# Patient Record
Sex: MALE | Race: WHITE | Employment: OTHER | URBAN - METROPOLITAN AREA
[De-identification: names, ages, dates, MRNs, and addresses within clinical notes are randomized per-mention and may not be internally consistent; named-entity substitution may affect disease eponyms.]

---

## 2017-01-11 ENCOUNTER — TELEPHONE (OUTPATIENT)
Dept: FAMILY MEDICINE CLINIC | Age: 77
End: 2017-01-11

## 2017-01-11 DIAGNOSIS — L30.8 OTHER ECZEMA: Primary | ICD-10-CM

## 2017-01-30 ENCOUNTER — TELEPHONE (OUTPATIENT)
Dept: FAMILY MEDICINE CLINIC | Age: 77
End: 2017-01-30

## 2017-06-14 ENCOUNTER — OFFICE VISIT (OUTPATIENT)
Dept: FAMILY MEDICINE CLINIC | Age: 77
End: 2017-06-14

## 2017-06-14 VITALS
DIASTOLIC BLOOD PRESSURE: 72 MMHG | WEIGHT: 190 LBS | HEIGHT: 71 IN | SYSTOLIC BLOOD PRESSURE: 122 MMHG | BODY MASS INDEX: 26.6 KG/M2 | RESPIRATION RATE: 20 BRPM | HEART RATE: 60 BPM | TEMPERATURE: 97.9 F

## 2017-06-14 DIAGNOSIS — E55.9 VITAMIN D DEFICIENCY: ICD-10-CM

## 2017-06-14 DIAGNOSIS — E72.11 HYPERHOMOCYSTEINEMIA (HCC): ICD-10-CM

## 2017-06-14 DIAGNOSIS — E11.42 TYPE 2 DIABETES MELLITUS WITH DIABETIC POLYNEUROPATHY, WITHOUT LONG-TERM CURRENT USE OF INSULIN (HCC): Primary | ICD-10-CM

## 2017-06-14 DIAGNOSIS — R45.86 MOOD DISTURBANCE: ICD-10-CM

## 2017-06-14 DIAGNOSIS — E78.5 DYSLIPIDEMIA: ICD-10-CM

## 2017-06-14 DIAGNOSIS — D86.0 SARCOIDOSIS OF LUNG (HCC): ICD-10-CM

## 2017-06-14 DIAGNOSIS — I10 ESSENTIAL HYPERTENSION: ICD-10-CM

## 2017-06-14 DIAGNOSIS — R97.20 ELEVATED PSA: ICD-10-CM

## 2017-06-14 DIAGNOSIS — I70.0 CALCIFICATION OF ABDOMINAL AORTA (HCC): ICD-10-CM

## 2017-06-14 PROCEDURE — 1036F TOBACCO NON-USER: CPT | Performed by: FAMILY MEDICINE

## 2017-06-14 PROCEDURE — 3046F HEMOGLOBIN A1C LEVEL >9.0%: CPT | Performed by: FAMILY MEDICINE

## 2017-06-14 PROCEDURE — 99214 OFFICE O/P EST MOD 30 MIN: CPT | Performed by: FAMILY MEDICINE

## 2017-06-14 PROCEDURE — G8427 DOCREV CUR MEDS BY ELIG CLIN: HCPCS | Performed by: FAMILY MEDICINE

## 2017-06-14 PROCEDURE — 3017F COLORECTAL CA SCREEN DOC REV: CPT | Performed by: FAMILY MEDICINE

## 2017-06-14 PROCEDURE — 1123F ACP DISCUSS/DSCN MKR DOCD: CPT | Performed by: FAMILY MEDICINE

## 2017-06-14 PROCEDURE — G8419 CALC BMI OUT NRM PARAM NOF/U: HCPCS | Performed by: FAMILY MEDICINE

## 2017-06-14 PROCEDURE — 4040F PNEUMOC VAC/ADMIN/RCVD: CPT | Performed by: FAMILY MEDICINE

## 2017-06-14 RX ORDER — FINASTERIDE 5 MG/1
5 TABLET, FILM COATED ORAL DAILY
COMMUNITY

## 2017-06-14 ASSESSMENT — PATIENT HEALTH QUESTIONNAIRE - PHQ9
1. LITTLE INTEREST OR PLEASURE IN DOING THINGS: 0
SUM OF ALL RESPONSES TO PHQ9 QUESTIONS 1 & 2: 0
2. FEELING DOWN, DEPRESSED OR HOPELESS: 0
SUM OF ALL RESPONSES TO PHQ QUESTIONS 1-9: 0

## 2017-06-19 DIAGNOSIS — E78.5 DYSLIPIDEMIA: ICD-10-CM

## 2017-06-19 DIAGNOSIS — I10 ESSENTIAL HYPERTENSION: ICD-10-CM

## 2017-06-19 LAB
A/G RATIO: 1.6 (ref 1.1–2.2)
ALBUMIN SERPL-MCNC: 4.8 G/DL (ref 3.4–5)
ALP BLD-CCNC: 79 U/L (ref 40–129)
ALT SERPL-CCNC: 20 U/L (ref 10–40)
ANION GAP SERPL CALCULATED.3IONS-SCNC: 13 MMOL/L (ref 3–16)
AST SERPL-CCNC: 19 U/L (ref 15–37)
BILIRUB SERPL-MCNC: 0.7 MG/DL (ref 0–1)
BUN BLDV-MCNC: 19 MG/DL (ref 7–20)
CALCIUM SERPL-MCNC: 10.1 MG/DL (ref 8.3–10.6)
CHLORIDE BLD-SCNC: 104 MMOL/L (ref 99–110)
CHOLESTEROL, TOTAL: 124 MG/DL (ref 0–199)
CO2: 27 MMOL/L (ref 21–32)
CREAT SERPL-MCNC: 0.9 MG/DL (ref 0.8–1.3)
CREATININE URINE: 109.9 MG/DL (ref 39–259)
GFR AFRICAN AMERICAN: >60
GFR NON-AFRICAN AMERICAN: >60
GLOBULIN: 3 G/DL
GLUCOSE BLD-MCNC: 99 MG/DL (ref 70–99)
HDLC SERPL-MCNC: 65 MG/DL (ref 40–60)
LDL CHOLESTEROL CALCULATED: 50 MG/DL
MICROALBUMIN UR-MCNC: <1.2 MG/DL
MICROALBUMIN/CREAT UR-RTO: NORMAL MG/G (ref 0–30)
POTASSIUM SERPL-SCNC: 4.1 MMOL/L (ref 3.5–5.1)
SODIUM BLD-SCNC: 144 MMOL/L (ref 136–145)
TOTAL PROTEIN: 7.8 G/DL (ref 6.4–8.2)
TRIGL SERPL-MCNC: 45 MG/DL (ref 0–150)
VITAMIN D 25-HYDROXY: 44.9 NG/ML
VLDLC SERPL CALC-MCNC: 9 MG/DL

## 2017-06-20 LAB
ESTIMATED AVERAGE GLUCOSE: 108.3 MG/DL
HBA1C MFR BLD: 5.4 %

## 2017-07-05 RX ORDER — ATORVASTATIN CALCIUM 40 MG/1
TABLET, FILM COATED ORAL
Qty: 30 TABLET | Refills: 5 | Status: SHIPPED | OUTPATIENT
Start: 2017-07-05 | End: 2018-01-04 | Stop reason: SDUPTHER

## 2017-07-31 RX ORDER — HYDROCHLOROTHIAZIDE 12.5 MG/1
CAPSULE, GELATIN COATED ORAL
Qty: 30 CAPSULE | Refills: 5 | Status: SHIPPED | OUTPATIENT
Start: 2017-07-31 | End: 2018-01-25 | Stop reason: SDUPTHER

## 2017-08-15 RX ORDER — AMLODIPINE BESYLATE 2.5 MG/1
TABLET ORAL
Qty: 30 TABLET | Refills: 4 | Status: SHIPPED | OUTPATIENT
Start: 2017-08-15 | End: 2018-01-12 | Stop reason: SDUPTHER

## 2017-10-17 RX ORDER — METFORMIN HYDROCHLORIDE 500 MG/1
TABLET, EXTENDED RELEASE ORAL
Qty: 60 TABLET | Refills: 4 | Status: SHIPPED | OUTPATIENT
Start: 2017-10-17 | End: 2018-03-07 | Stop reason: SDUPTHER

## 2017-10-23 ENCOUNTER — OFFICE VISIT (OUTPATIENT)
Dept: FAMILY MEDICINE CLINIC | Age: 77
End: 2017-10-23

## 2017-10-23 VITALS
WEIGHT: 186 LBS | DIASTOLIC BLOOD PRESSURE: 60 MMHG | RESPIRATION RATE: 20 BRPM | TEMPERATURE: 98.2 F | HEART RATE: 88 BPM | BODY MASS INDEX: 26.04 KG/M2 | HEIGHT: 71 IN | SYSTOLIC BLOOD PRESSURE: 116 MMHG

## 2017-10-23 DIAGNOSIS — E78.5 DYSLIPIDEMIA: ICD-10-CM

## 2017-10-23 DIAGNOSIS — H25.9 AGE-RELATED CATARACT OF BOTH EYES, UNSPECIFIED AGE-RELATED CATARACT TYPE: ICD-10-CM

## 2017-10-23 DIAGNOSIS — E72.11 HYPERHOMOCYSTEINEMIA (HCC): ICD-10-CM

## 2017-10-23 DIAGNOSIS — Z01.818 PREOP EXAMINATION: Primary | ICD-10-CM

## 2017-10-23 DIAGNOSIS — E11.42 TYPE 2 DIABETES MELLITUS WITH DIABETIC POLYNEUROPATHY, WITHOUT LONG-TERM CURRENT USE OF INSULIN (HCC): ICD-10-CM

## 2017-10-23 DIAGNOSIS — I10 ESSENTIAL HYPERTENSION: ICD-10-CM

## 2017-10-23 LAB — HBA1C MFR BLD: 5.6 %

## 2017-10-23 PROCEDURE — 1036F TOBACCO NON-USER: CPT | Performed by: FAMILY MEDICINE

## 2017-10-23 PROCEDURE — G8427 DOCREV CUR MEDS BY ELIG CLIN: HCPCS | Performed by: FAMILY MEDICINE

## 2017-10-23 PROCEDURE — G8484 FLU IMMUNIZE NO ADMIN: HCPCS | Performed by: FAMILY MEDICINE

## 2017-10-23 PROCEDURE — 3044F HG A1C LEVEL LT 7.0%: CPT | Performed by: FAMILY MEDICINE

## 2017-10-23 PROCEDURE — 93000 ELECTROCARDIOGRAM COMPLETE: CPT | Performed by: FAMILY MEDICINE

## 2017-10-23 PROCEDURE — 3017F COLORECTAL CA SCREEN DOC REV: CPT | Performed by: FAMILY MEDICINE

## 2017-10-23 PROCEDURE — 83036 HEMOGLOBIN GLYCOSYLATED A1C: CPT | Performed by: FAMILY MEDICINE

## 2017-10-23 PROCEDURE — 4040F PNEUMOC VAC/ADMIN/RCVD: CPT | Performed by: FAMILY MEDICINE

## 2017-10-23 PROCEDURE — 99214 OFFICE O/P EST MOD 30 MIN: CPT | Performed by: FAMILY MEDICINE

## 2017-10-23 PROCEDURE — 1123F ACP DISCUSS/DSCN MKR DOCD: CPT | Performed by: FAMILY MEDICINE

## 2017-10-23 PROCEDURE — G8417 CALC BMI ABV UP PARAM F/U: HCPCS | Performed by: FAMILY MEDICINE

## 2017-10-23 PROCEDURE — G8599 NO ASA/ANTIPLAT THER USE RNG: HCPCS | Performed by: FAMILY MEDICINE

## 2017-10-23 ASSESSMENT — ENCOUNTER SYMPTOMS
EYES NEGATIVE: 1
GASTROINTESTINAL NEGATIVE: 1
RESPIRATORY NEGATIVE: 1

## 2017-10-23 NOTE — PROGRESS NOTES
Component Value Date    ALT 20 06/19/2017    AST 19 06/19/2017         OA uses OTC glucosamine. This works well for him    Duration of problem: has bothered him for past year. Assoc sx are: reduced vision, ngoc at night. Alleviating factors are: none. Planned anesthesia is Local, IV sedation. The patient has the following known anesthesia issues:  NAUSEA WITH ANESTHESIA  Patient has a bleeding risk of : none. Past Medical History:   Diagnosis Date    Arthritis     Depression     Enlarged prostate     HTN (hypertension)     Hyperlipidemia     Sarcoidosis of lung- Dr Lorrie Baker 6/9/3559     Past Surgical History:   Procedure Laterality Date    ANKLE SURGERY      2014    NOSE SURGERY  30 years ago    deviated septum    PANCREATECTOMY  3/11/14    Dr Romayne Munda- Subtotal distal pancreatectomy.  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)    SPLENECTOMY, TOTAL  3.11.14    Dr Romayne Munda     Family History   Problem Relation Age of Onset    Diabetes      High Blood Pressure      Heart Disease Father      Social History     Social History    Marital status:      Spouse name: Jeff Barton Number of children: 2    Years of education: N/A     Occupational History    Retired CGE: constuction/      Social History Main Topics    Smoking status: Never Smoker    Smokeless tobacco: Never Used    Alcohol use 0.0 oz/week      Comment: socially    Drug use: No    Sexual activity: Yes     Partners: Female      Comment: wife Theesther Murray     Other Topics Concern    None     Social History Narrative    None     Current Outpatient Prescriptions   Medication Sig Dispense Refill    metFORMIN (GLUCOPHAGE-XR) 500 MG extended release tablet TAKE TWO TABLETS BY MOUTH DAILY WITH BREAKFAST 60 tablet 4    amLODIPine (NORVASC) 2.5 MG tablet TAKE ONE TABLET BY MOUTH DAILY 30 tablet 4    hydrochlorothiazide (MICROZIDE) 12.5 MG capsule TAKE ONE CAPSULE BY MOUTH DAILY 30 capsule 5    atorvastatin (LIPITOR) 40 MG Eyes:  PERRL, conjunctiva/corneas clear, EOM's intact. Ears:  Normal TM's and external ear canals, both ears   Nose: Nares normal, septum midline, mucosa normal, no drainage or sinus tenderness   Throat: Lips, mucosa, and tongue normal; teeth and gums normal   Neck: Supple, symmetrical, trachea midline, no adenopathy, thyroid: not enlarged, symmetric, no tenderness/mass/nodules, no carotid bruit or JVD   Back:   Symmetric, no curvature, ROM normal, no CVA tenderness   Lungs:   Clear to auscultation bilaterally, respirations unlabored   Chest Wall:  No tenderness or deformity   Heart:  Regular rate and rhythm, S1, S2 normal, no murmur, rub or gallop   Abdomen:   Soft, non-tender, bowel sounds active all four quadrants,  no masses, no organomegaly   Extremities: Extremities normal, atraumatic. 1+ pitting LE edema bilat   Pulses: 2+ and symmetric   Skin: Skin color, texture, turgor normal, no rashes or lesions   Lymph nodes: Cervical, supraclavicular, and axillary nodes normal   Neurologic: Normal     Cardiographics  ECG: sinus micaela, rate 50. No st/t changes    Lab Review   No visits with results within 2 Month(s) from this visit. Latest known visit with results is:   Orders Only on 06/19/2017   Component Date Value    Hemoglobin A1C 06/20/2017 5.4     eAG 06/20/2017 108.3     MICROALBUMIN, RANDOM URI* 06/19/2017 <1.20     Creatinine, Ur 06/19/2017 109.9     Microalb Creat Ratio 06/19/2017 see below     Vit D, 25-Hydroxy 06/19/2017 44.9           Assessment:   77 y.o. male with planned surgery as above. 1. Preop examination  - cleared for surgery. 2. Age-related cataract of both eyes, unspecified age-related cataract type  - agree with surgical correction    3. Type 2 diabetes mellitus with diabetic polyneuropathy, without long-term current use of insulin (HCC)  - appears to have good control. Check A1c- 5.6%. Hold metformin prior to surgery. - POCT glycosylated hemoglobin (Hb A1C)    4.

## 2018-01-04 RX ORDER — ATORVASTATIN CALCIUM 40 MG/1
TABLET, FILM COATED ORAL
Qty: 30 TABLET | Refills: 4 | Status: SHIPPED | OUTPATIENT
Start: 2018-01-04 | End: 2018-03-07 | Stop reason: SDUPTHER

## 2018-01-26 RX ORDER — HYDROCHLOROTHIAZIDE 12.5 MG/1
CAPSULE, GELATIN COATED ORAL
Qty: 30 CAPSULE | Refills: 4 | Status: SHIPPED | OUTPATIENT
Start: 2018-01-26 | End: 2018-03-07 | Stop reason: SDUPTHER

## 2018-01-29 RX ORDER — BLOOD-GLUCOSE METER
KIT MISCELLANEOUS
Qty: 50 STRIP | Refills: 5 | Status: SHIPPED | OUTPATIENT
Start: 2018-01-29 | End: 2019-03-25 | Stop reason: SDUPTHER

## 2018-03-07 RX ORDER — METFORMIN HYDROCHLORIDE 500 MG/1
TABLET, EXTENDED RELEASE ORAL
Qty: 60 TABLET | Refills: 5 | Status: SHIPPED | OUTPATIENT
Start: 2018-03-07 | End: 2018-04-23

## 2018-03-07 RX ORDER — ATORVASTATIN CALCIUM 40 MG/1
TABLET, FILM COATED ORAL
Qty: 30 TABLET | Refills: 5 | Status: SHIPPED | OUTPATIENT
Start: 2018-03-07 | End: 2019-10-22

## 2018-03-07 RX ORDER — HYDROCHLOROTHIAZIDE 12.5 MG/1
CAPSULE, GELATIN COATED ORAL
Qty: 30 CAPSULE | Refills: 5 | Status: SHIPPED | OUTPATIENT
Start: 2018-03-07 | End: 2019-10-22

## 2018-03-07 RX ORDER — AMLODIPINE BESYLATE 2.5 MG/1
TABLET ORAL
Qty: 30 TABLET | Refills: 5 | Status: SHIPPED | OUTPATIENT
Start: 2018-03-07 | End: 2019-10-22

## 2018-03-19 RX ORDER — METFORMIN HYDROCHLORIDE 500 MG/1
TABLET, EXTENDED RELEASE ORAL
Qty: 60 TABLET | Refills: 3 | Status: SHIPPED | OUTPATIENT
Start: 2018-03-19 | End: 2018-07-13 | Stop reason: SDUPTHER

## 2018-04-23 ENCOUNTER — OFFICE VISIT (OUTPATIENT)
Dept: FAMILY MEDICINE CLINIC | Age: 78
End: 2018-04-23

## 2018-04-23 VITALS
WEIGHT: 186 LBS | TEMPERATURE: 98.4 F | HEIGHT: 71 IN | DIASTOLIC BLOOD PRESSURE: 66 MMHG | SYSTOLIC BLOOD PRESSURE: 104 MMHG | OXYGEN SATURATION: 98 % | HEART RATE: 56 BPM | BODY MASS INDEX: 26.04 KG/M2 | RESPIRATION RATE: 16 BRPM

## 2018-04-23 DIAGNOSIS — D86.0 SARCOIDOSIS OF LUNG (HCC): ICD-10-CM

## 2018-04-23 DIAGNOSIS — E72.11 HYPERHOMOCYSTEINEMIA (HCC): ICD-10-CM

## 2018-04-23 DIAGNOSIS — I70.0 CALCIFICATION OF ABDOMINAL AORTA (HCC): ICD-10-CM

## 2018-04-23 DIAGNOSIS — D3A.8 NEUROENDOCRINE TUMOR OF PANCREAS: ICD-10-CM

## 2018-04-23 DIAGNOSIS — E78.5 DYSLIPIDEMIA: ICD-10-CM

## 2018-04-23 DIAGNOSIS — L82.1 SEBORRHEIC KERATOSIS: ICD-10-CM

## 2018-04-23 DIAGNOSIS — E11.42 TYPE 2 DIABETES MELLITUS WITH DIABETIC POLYNEUROPATHY, WITHOUT LONG-TERM CURRENT USE OF INSULIN (HCC): ICD-10-CM

## 2018-04-23 DIAGNOSIS — I10 ESSENTIAL HYPERTENSION: Primary | ICD-10-CM

## 2018-04-23 DIAGNOSIS — R45.86 MOOD DISTURBANCE: ICD-10-CM

## 2018-04-23 LAB — HBA1C MFR BLD: 5.6 %

## 2018-04-23 PROCEDURE — G8417 CALC BMI ABV UP PARAM F/U: HCPCS | Performed by: FAMILY MEDICINE

## 2018-04-23 PROCEDURE — 83036 HEMOGLOBIN GLYCOSYLATED A1C: CPT | Performed by: FAMILY MEDICINE

## 2018-04-23 PROCEDURE — G8427 DOCREV CUR MEDS BY ELIG CLIN: HCPCS | Performed by: FAMILY MEDICINE

## 2018-04-23 PROCEDURE — G8598 ASA/ANTIPLAT THER USED: HCPCS | Performed by: FAMILY MEDICINE

## 2018-04-23 PROCEDURE — 1036F TOBACCO NON-USER: CPT | Performed by: FAMILY MEDICINE

## 2018-04-23 PROCEDURE — 99214 OFFICE O/P EST MOD 30 MIN: CPT | Performed by: FAMILY MEDICINE

## 2018-04-23 PROCEDURE — 4040F PNEUMOC VAC/ADMIN/RCVD: CPT | Performed by: FAMILY MEDICINE

## 2018-04-23 PROCEDURE — 1123F ACP DISCUSS/DSCN MKR DOCD: CPT | Performed by: FAMILY MEDICINE

## 2018-04-23 RX ORDER — ASPIRIN 81 MG/1
81 TABLET, CHEWABLE ORAL DAILY
Refills: 0 | COMMUNITY
Start: 2018-04-23

## 2018-06-12 DIAGNOSIS — I10 ESSENTIAL HYPERTENSION: ICD-10-CM

## 2018-06-12 DIAGNOSIS — E78.5 DYSLIPIDEMIA: ICD-10-CM

## 2018-06-12 DIAGNOSIS — E72.11 HYPERHOMOCYSTEINEMIA (HCC): ICD-10-CM

## 2018-06-12 LAB
A/G RATIO: 1.9 (ref 1.1–2.2)
ALBUMIN SERPL-MCNC: 4.6 G/DL (ref 3.4–5)
ALP BLD-CCNC: 81 U/L (ref 40–129)
ALT SERPL-CCNC: 37 U/L (ref 10–40)
ANION GAP SERPL CALCULATED.3IONS-SCNC: 14 MMOL/L (ref 3–16)
AST SERPL-CCNC: 33 U/L (ref 15–37)
BILIRUB SERPL-MCNC: 0.6 MG/DL (ref 0–1)
BUN BLDV-MCNC: 19 MG/DL (ref 7–20)
CALCIUM SERPL-MCNC: 9.7 MG/DL (ref 8.3–10.6)
CHLORIDE BLD-SCNC: 106 MMOL/L (ref 99–110)
CHOLESTEROL, TOTAL: 112 MG/DL (ref 0–199)
CO2: 27 MMOL/L (ref 21–32)
CREAT SERPL-MCNC: 0.9 MG/DL (ref 0.8–1.3)
GFR AFRICAN AMERICAN: >60
GFR NON-AFRICAN AMERICAN: >60
GLOBULIN: 2.4 G/DL
GLUCOSE BLD-MCNC: 101 MG/DL (ref 70–99)
HDLC SERPL-MCNC: 70 MG/DL (ref 40–60)
HOMOCYSTEINE: 11 UMOL/L (ref 0–10)
LDL CHOLESTEROL CALCULATED: 35 MG/DL
POTASSIUM SERPL-SCNC: 4.7 MMOL/L (ref 3.5–5.1)
SODIUM BLD-SCNC: 147 MMOL/L (ref 136–145)
TOTAL PROTEIN: 7 G/DL (ref 6.4–8.2)
TRIGL SERPL-MCNC: 33 MG/DL (ref 0–150)
VLDLC SERPL CALC-MCNC: 7 MG/DL

## 2018-06-14 RX ORDER — B12/LEVOMEFOLATE CALCIUM/B-6 2-1.13-25
2 TABLET ORAL DAILY
Qty: 60 TABLET | Refills: 11 | Status: SHIPPED | OUTPATIENT
Start: 2018-06-14

## 2018-06-19 RX ORDER — HYDROCHLOROTHIAZIDE 12.5 MG/1
CAPSULE, GELATIN COATED ORAL
Qty: 30 CAPSULE | Refills: 5 | Status: SHIPPED | OUTPATIENT
Start: 2018-06-19 | End: 2018-12-17 | Stop reason: SDUPTHER

## 2018-06-27 ENCOUNTER — TELEPHONE (OUTPATIENT)
Dept: FAMILY MEDICINE CLINIC | Age: 78
End: 2018-06-27

## 2018-07-09 RX ORDER — AMLODIPINE BESYLATE 2.5 MG/1
TABLET ORAL
Qty: 30 TABLET | Refills: 4 | Status: SHIPPED | OUTPATIENT
Start: 2018-07-09 | End: 2018-12-03 | Stop reason: SDUPTHER

## 2018-09-04 RX ORDER — ATORVASTATIN CALCIUM 40 MG/1
TABLET, FILM COATED ORAL
Qty: 30 TABLET | Refills: 4 | Status: SHIPPED | OUTPATIENT
Start: 2018-09-04 | End: 2019-02-08

## 2018-10-08 ENCOUNTER — OFFICE VISIT (OUTPATIENT)
Dept: ENT CLINIC | Age: 78
End: 2018-10-08
Payer: MEDICARE

## 2018-10-08 VITALS
HEART RATE: 53 BPM | HEIGHT: 71 IN | SYSTOLIC BLOOD PRESSURE: 135 MMHG | WEIGHT: 190.4 LBS | BODY MASS INDEX: 26.65 KG/M2 | DIASTOLIC BLOOD PRESSURE: 73 MMHG

## 2018-10-08 DIAGNOSIS — H93.8X2 FULLNESS IN EAR, LEFT: Primary | ICD-10-CM

## 2018-10-08 PROCEDURE — G8484 FLU IMMUNIZE NO ADMIN: HCPCS | Performed by: OTOLARYNGOLOGY

## 2018-10-08 PROCEDURE — G8598 ASA/ANTIPLAT THER USED: HCPCS | Performed by: OTOLARYNGOLOGY

## 2018-10-08 PROCEDURE — 1123F ACP DISCUSS/DSCN MKR DOCD: CPT | Performed by: OTOLARYNGOLOGY

## 2018-10-08 PROCEDURE — 4040F PNEUMOC VAC/ADMIN/RCVD: CPT | Performed by: OTOLARYNGOLOGY

## 2018-10-08 PROCEDURE — G8427 DOCREV CUR MEDS BY ELIG CLIN: HCPCS | Performed by: OTOLARYNGOLOGY

## 2018-10-08 PROCEDURE — 1036F TOBACCO NON-USER: CPT | Performed by: OTOLARYNGOLOGY

## 2018-10-08 PROCEDURE — 1101F PT FALLS ASSESS-DOCD LE1/YR: CPT | Performed by: OTOLARYNGOLOGY

## 2018-10-08 PROCEDURE — G8417 CALC BMI ABV UP PARAM F/U: HCPCS | Performed by: OTOLARYNGOLOGY

## 2018-10-08 PROCEDURE — 99203 OFFICE O/P NEW LOW 30 MIN: CPT | Performed by: OTOLARYNGOLOGY

## 2018-10-08 NOTE — PROGRESS NOTES
test strips (FREESTYLE TEST STRIPS) strip, 1 each by In Vitro route 3 times daily As needed. , Disp: 300 each, Rfl: 3    glucose monitoring kit (FREESTYLE) monitoring kit, 1 kit by Does not apply route daily as needed, Disp: 1 kit, Rfl: 0    FREESTYLE LANCETS MISC, 1 each by Does not apply route daily, Disp: 100 each, Rfl: 3    tamsulosin (FLOMAX) 0.4 MG capsule, Take 0.4 mg by mouth daily, Disp: , Rfl:     polyethylene glycol (GLYCOLAX) powder, Take 17 g by mouth daily, Disp: , Rfl:     Cholecalciferol (VITAMIN D) 2000 UNITS CAPS capsule, Take 4,000 Units by mouth daily, Disp: , Rfl:     Misc Natural Products (GLUCOSAMINE CHOND COMPLEX/MSM PO), Take 1,500 mg by mouth daily , Disp: , Rfl:                                                  Past Medical History:   Diagnosis Date    Arthritis     Depression     Enlarged prostate     HTN (hypertension)     Hyperlipidemia     Sarcoidosis of lung- Dr Mel Du 5/2/8345                                                    Past Surgical History:   Procedure Laterality Date    ANKLE SURGERY      2014    NOSE SURGERY  30 years ago    deviated septum    PANCREATECTOMY  3/11/14    Dr Joseph Buchanan- Subtotal distal pancreatectomy. 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 Otherwise, all systems are reviewed and negative. PHYSICAL EXAMINATION:   GENERAL: wdwn- no acute distress  COMMUNICATION :  Normal voice  MENTAL STATUS:  Normal  HEAD AND FACE:  Normal  EXTERNAL EARS AND NOSE:  Normal  FACIAL MUSCLES:  Normal  EXTRAOCULAR MUSCLES: Intact  FACE PALPATION:  Negative  OTOSCOPY:  Normal tympanic membranes and middle ear spaces  TUNING FORKS: Rinne ++ Wood midline at 512 Hz  INTRANASAL:  Septum midline, turbinates normal, meati clear.   LIPS, TEETH, GINGIVA:  Normal mucosa  PHARYNX:  Normal  NECK:  No masses or

## 2018-10-23 ENCOUNTER — OFFICE VISIT (OUTPATIENT)
Dept: FAMILY MEDICINE CLINIC | Age: 78
End: 2018-10-23
Payer: MEDICARE

## 2018-10-23 VITALS
TEMPERATURE: 97.8 F | BODY MASS INDEX: 26.74 KG/M2 | HEIGHT: 71 IN | RESPIRATION RATE: 15 BRPM | SYSTOLIC BLOOD PRESSURE: 124 MMHG | HEART RATE: 57 BPM | WEIGHT: 191 LBS | DIASTOLIC BLOOD PRESSURE: 62 MMHG

## 2018-10-23 DIAGNOSIS — E72.11 HYPERHOMOCYSTEINEMIA (HCC): ICD-10-CM

## 2018-10-23 DIAGNOSIS — I70.0 CALCIFICATION OF ABDOMINAL AORTA (HCC): ICD-10-CM

## 2018-10-23 DIAGNOSIS — K64.4 EXTERNAL HEMORRHOIDS: ICD-10-CM

## 2018-10-23 DIAGNOSIS — E78.5 DYSLIPIDEMIA: ICD-10-CM

## 2018-10-23 DIAGNOSIS — I10 ESSENTIAL HYPERTENSION: ICD-10-CM

## 2018-10-23 DIAGNOSIS — E11.42 TYPE 2 DIABETES MELLITUS WITH DIABETIC POLYNEUROPATHY, WITHOUT LONG-TERM CURRENT USE OF INSULIN (HCC): Primary | ICD-10-CM

## 2018-10-23 LAB — HBA1C MFR BLD: 5.8 %

## 2018-10-23 PROCEDURE — G8427 DOCREV CUR MEDS BY ELIG CLIN: HCPCS | Performed by: FAMILY MEDICINE

## 2018-10-23 PROCEDURE — G8482 FLU IMMUNIZE ORDER/ADMIN: HCPCS | Performed by: FAMILY MEDICINE

## 2018-10-23 PROCEDURE — 83036 HEMOGLOBIN GLYCOSYLATED A1C: CPT | Performed by: FAMILY MEDICINE

## 2018-10-23 PROCEDURE — 1036F TOBACCO NON-USER: CPT | Performed by: FAMILY MEDICINE

## 2018-10-23 PROCEDURE — 99214 OFFICE O/P EST MOD 30 MIN: CPT | Performed by: FAMILY MEDICINE

## 2018-10-23 PROCEDURE — G8598 ASA/ANTIPLAT THER USED: HCPCS | Performed by: FAMILY MEDICINE

## 2018-10-23 PROCEDURE — 1123F ACP DISCUSS/DSCN MKR DOCD: CPT | Performed by: FAMILY MEDICINE

## 2018-10-23 PROCEDURE — G8417 CALC BMI ABV UP PARAM F/U: HCPCS | Performed by: FAMILY MEDICINE

## 2018-10-23 PROCEDURE — 4040F PNEUMOC VAC/ADMIN/RCVD: CPT | Performed by: FAMILY MEDICINE

## 2018-10-23 PROCEDURE — 1101F PT FALLS ASSESS-DOCD LE1/YR: CPT | Performed by: FAMILY MEDICINE

## 2018-10-23 ASSESSMENT — PATIENT HEALTH QUESTIONNAIRE - PHQ9
SUM OF ALL RESPONSES TO PHQ QUESTIONS 1-9: 0
1. LITTLE INTEREST OR PLEASURE IN DOING THINGS: 0
SUM OF ALL RESPONSES TO PHQ9 QUESTIONS 1 & 2: 0
2. FEELING DOWN, DEPRESSED OR HOPELESS: 0
SUM OF ALL RESPONSES TO PHQ QUESTIONS 1-9: 0

## 2018-10-23 NOTE — PROGRESS NOTES
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  Calcification of abdominal aorta (HCC)    Eczema    S/P colonoscopy with polypectomy-12/8/16; [facility]; multiple polyps. recall 3 yrs.  Type 2 diabetes mellitus with diabetic polyneuropathy, without long-term current use of insulin (HCC)      Body mass index is 26.64 kg/m².     Wt Readings from Last 3 Encounters:   10/23/18 191 lb (86.6 kg)   10/08/18 190 lb 6.4 oz (86.4 kg)   04/23/18 186 lb (84.4 kg)      BP Readings from Last 3 Encounters:   10/23/18 124/62   10/08/18 135/73   04/23/18 104/66      Current Outpatient Prescriptions   Medication Sig Dispense Refill    atorvastatin (LIPITOR) 40 MG tablet TAKE ONE TABLET BY MOUTH DAILY 30 tablet 4    metFORMIN (GLUCOPHAGE-XR) 500 MG extended release tablet TAKE TWO TABLETS BY MOUTH DAILY WITH BREAKFAST 60 tablet 5    amLODIPine (NORVASC) 2.5 MG tablet TAKE ONE TABLET BY MOUTH DAILY 30 tablet 4    hydrochlorothiazide (MICROZIDE) 12.5 MG capsule TAKE ONE CAPSULE BY MOUTH DAILY 30 capsule 5    L-Methylfolate-B6-B12 (FOLBIC RF) 1.13-25-2 MG TABS Take 2 tablets by mouth daily 60 tablet 11    aspirin (ASPIRIN CHILDRENS) 81 MG chewable tablet Take 1 tablet by mouth daily  0    hydrochlorothiazide (MICROZIDE) 12.5 MG capsule TAKE ONE CAPSULE BY MOUTH DAILY 30 capsule 5    amLODIPine (NORVASC) 2.5 MG tablet TAKE ONE TABLET BY MOUTH DAILY 30 tablet 5    atorvastatin (LIPITOR) 40 MG tablet TAKE ONE TABLET BY MOUTH DAILY 30 tablet 5    FREESTYLE LITE strip USE TO TEST BLOOD SUGAR DAILY AS NEEDED 50 strip 5    finasteride (PROSCAR) 5 MG tablet Take 5 mg by mouth daily      hydrocortisone (PROCTOZONE-HC) 2.5 % rectal cream PLACE RECTALLY TWO TIMES A DAY AS NEEDED 30 g 1    hydrocortisone (WESTCORT) 0.2 % cream Apply topically 2 times daily. 15 g 0    glucose blood VI test strips (FREESTYLE TEST STRIPS) strip 1 each by In Vitro route 3 times daily As needed. 300 each 3    glucose monitoring kit (FREESTYLE) monitoring kit 1 kit by Does not apply route daily as needed 1 kit 0    FREESTYLE LANCETS MISC 1 each by Does not apply route daily 100 each 3    tamsulosin (FLOMAX) 0.4 MG capsule Take 0.4 mg by mouth daily      polyethylene glycol (GLYCOLAX) powder Take 17 g by mouth daily      Cholecalciferol (VITAMIN D) 2000 UNITS CAPS capsule Take 4,000 Units by mouth daily      Misc Natural Products (GLUCOSAMINE CHOND COMPLEX/MSM PO) Take 1,500 mg by mouth daily        No current facility-administered medications for this visit. Immunization History   Administered Date(s) Administered    Influenza Vaccine, unspecified formulation 10/10/2016    Influenza, High Dose (Fluzone 65 yrs and older) 10/09/2013, 11/07/2014    Pneumococcal 13-valent Conjugate ([id number]) 02/23/2016    Pneumococcal Polysaccharide ([id number]) 01/08/2013        Social History   Substance Use Topics    Smoking status: Never Smoker    Smokeless tobacco: Never Used    Alcohol use 0.0 oz/week      Comment: socially        Review of Systems No chest pains, dizziness, heart palpitations, dyspnea, lightheadedness, worsening edema. Objective:   Physical Exam   Constitutional: He is oriented to person, place, and time. He appears well-developed and well-nourished. Neck: Normal range of motion. Neck supple. Carotid bruit is not present. No thyromegaly present. Cardiovascular: Normal rate, regular rhythm, normal heart sounds and intact distal pulses. No murmur heard.   Pulmonary/Chest: Effort normal and breath sounds

## 2018-10-29 RX ORDER — LANCETS 28 GAUGE
EACH MISCELLANEOUS
Qty: 100 EACH | Refills: 5 | Status: SHIPPED | OUTPATIENT
Start: 2018-10-29 | End: 2020-07-20

## 2019-02-08 ENCOUNTER — OFFICE VISIT (OUTPATIENT)
Dept: ENT CLINIC | Age: 79
End: 2019-02-08
Payer: MEDICARE

## 2019-02-08 VITALS
HEIGHT: 71 IN | TEMPERATURE: 98 F | BODY MASS INDEX: 27.05 KG/M2 | DIASTOLIC BLOOD PRESSURE: 52 MMHG | WEIGHT: 193.2 LBS | HEART RATE: 50 BPM | SYSTOLIC BLOOD PRESSURE: 122 MMHG

## 2019-02-08 DIAGNOSIS — R04.0 EPISTAXIS: Primary | ICD-10-CM

## 2019-02-08 PROCEDURE — 1036F TOBACCO NON-USER: CPT | Performed by: OTOLARYNGOLOGY

## 2019-02-08 PROCEDURE — G8417 CALC BMI ABV UP PARAM F/U: HCPCS | Performed by: OTOLARYNGOLOGY

## 2019-02-08 PROCEDURE — 99213 OFFICE O/P EST LOW 20 MIN: CPT | Performed by: OTOLARYNGOLOGY

## 2019-02-08 PROCEDURE — 1101F PT FALLS ASSESS-DOCD LE1/YR: CPT | Performed by: OTOLARYNGOLOGY

## 2019-02-08 PROCEDURE — 4040F PNEUMOC VAC/ADMIN/RCVD: CPT | Performed by: OTOLARYNGOLOGY

## 2019-02-08 PROCEDURE — G8598 ASA/ANTIPLAT THER USED: HCPCS | Performed by: OTOLARYNGOLOGY

## 2019-02-08 PROCEDURE — G8482 FLU IMMUNIZE ORDER/ADMIN: HCPCS | Performed by: OTOLARYNGOLOGY

## 2019-02-08 PROCEDURE — G8427 DOCREV CUR MEDS BY ELIG CLIN: HCPCS | Performed by: OTOLARYNGOLOGY

## 2019-02-08 PROCEDURE — 1123F ACP DISCUSS/DSCN MKR DOCD: CPT | Performed by: OTOLARYNGOLOGY

## 2019-03-25 RX ORDER — BLOOD-GLUCOSE METER
KIT MISCELLANEOUS
Qty: 50 STRIP | Refills: 4 | Status: SHIPPED | OUTPATIENT
Start: 2019-03-25 | End: 2020-07-20

## 2019-04-23 ENCOUNTER — OFFICE VISIT (OUTPATIENT)
Dept: FAMILY MEDICINE CLINIC | Age: 79
End: 2019-04-23
Payer: MEDICARE

## 2019-04-23 VITALS
RESPIRATION RATE: 18 BRPM | WEIGHT: 196 LBS | DIASTOLIC BLOOD PRESSURE: 64 MMHG | SYSTOLIC BLOOD PRESSURE: 122 MMHG | OXYGEN SATURATION: 98 % | TEMPERATURE: 98.1 F | HEIGHT: 71 IN | BODY MASS INDEX: 27.44 KG/M2 | HEART RATE: 58 BPM

## 2019-04-23 DIAGNOSIS — Z90.81 S/P SPLENECTOMY: ICD-10-CM

## 2019-04-23 DIAGNOSIS — D3A.8 NEUROENDOCRINE TUMOR OF PANCREAS: ICD-10-CM

## 2019-04-23 DIAGNOSIS — E78.5 DYSLIPIDEMIA: ICD-10-CM

## 2019-04-23 DIAGNOSIS — I10 ESSENTIAL HYPERTENSION: ICD-10-CM

## 2019-04-23 DIAGNOSIS — E72.11 HYPERHOMOCYSTEINEMIA (HCC): ICD-10-CM

## 2019-04-23 DIAGNOSIS — I70.0 CALCIFICATION OF ABDOMINAL AORTA (HCC): ICD-10-CM

## 2019-04-23 DIAGNOSIS — D86.0 SARCOIDOSIS OF LUNG (HCC): ICD-10-CM

## 2019-04-23 DIAGNOSIS — E11.42 TYPE 2 DIABETES MELLITUS WITH DIABETIC POLYNEUROPATHY, WITHOUT LONG-TERM CURRENT USE OF INSULIN (HCC): Primary | ICD-10-CM

## 2019-04-23 LAB — HBA1C MFR BLD: 5.6 %

## 2019-04-23 PROCEDURE — 4040F PNEUMOC VAC/ADMIN/RCVD: CPT | Performed by: FAMILY MEDICINE

## 2019-04-23 PROCEDURE — G8427 DOCREV CUR MEDS BY ELIG CLIN: HCPCS | Performed by: FAMILY MEDICINE

## 2019-04-23 PROCEDURE — G8598 ASA/ANTIPLAT THER USED: HCPCS | Performed by: FAMILY MEDICINE

## 2019-04-23 PROCEDURE — 83036 HEMOGLOBIN GLYCOSYLATED A1C: CPT | Performed by: FAMILY MEDICINE

## 2019-04-23 PROCEDURE — G8417 CALC BMI ABV UP PARAM F/U: HCPCS | Performed by: FAMILY MEDICINE

## 2019-04-23 PROCEDURE — 1036F TOBACCO NON-USER: CPT | Performed by: FAMILY MEDICINE

## 2019-04-23 PROCEDURE — 99214 OFFICE O/P EST MOD 30 MIN: CPT | Performed by: FAMILY MEDICINE

## 2019-04-23 PROCEDURE — 1123F ACP DISCUSS/DSCN MKR DOCD: CPT | Performed by: FAMILY MEDICINE

## 2019-04-23 ASSESSMENT — PATIENT HEALTH QUESTIONNAIRE - PHQ9
SUM OF ALL RESPONSES TO PHQ QUESTIONS 1-9: 0
1. LITTLE INTEREST OR PLEASURE IN DOING THINGS: 0
SUM OF ALL RESPONSES TO PHQ QUESTIONS 1-9: 0
2. FEELING DOWN, DEPRESSED OR HOPELESS: 0
SUM OF ALL RESPONSES TO PHQ9 QUESTIONS 1 & 2: 0

## 2019-04-23 NOTE — PROGRESS NOTES
Subjective:      Patient ID: Titi Webster is a 69 y.o. male. Blood pressure 122/64, pulse 58, temperature 98.1 °F (36.7 °C), temperature source Oral, resp. rate 18, height 5' 11\" (1.803 m), weight 196 lb (88.9 kg), SpO2 98 %. HPI Here for routine follow up of DM. He still feels low energy, in his estimation. Like he's not 26 any longer. Not worse than 6 mo ago. He has never had a sleep study. His sleep is disturbed by nocturia x 3-4 due to BPH. Sees Dr Mayelin Apple. But rarely naps and has enough power for chores. He feels that he is still depressed over losign his son also. He saw ENT for nosebleeds in the winter. He has been using nasal saline and that was effective (as long as he uses it consistently). He has no new concerns today. His allergies have been flared up recently. Using zyrtec and zaditor with good results. Has DM-2 with complications- DPN (sees DR Meenu Jaramillo, no complications- he mainly trims nails and pares calluses). Diet controlled. a1c is only 5.6. Checks glucoses rarely. Lab Results   Component Value Date    LABA1C 5.8 10/23/2018     Lab Results   Component Value Date    .3 06/19/2017      Sees eye doctor in June. Uses atorav 40. No SE's. Is not fasting today. Lab Results   Component Value Date    CHOL 112 06/12/2018    TRIG 33 06/12/2018    HDL 70 (H) 06/12/2018    LDLCALC 35 06/12/2018     Lab Results   Component Value Date    ALT 37 06/12/2018    AST 33 06/12/2018        He has not seen Dr Annika Zuleta for lung sarcoid for about 5 yrs. Breathing is good. No meds for this. He had neuroendocrine tumor of pancrease with subsequent partial pancreatectomy in 2014. Has the all clear, but needs ongoing vaccinations. He will be due for repeat vaccination for hib, meningococcal and pneumovax 5/19/19. Remains on methylfolate due to hyperhomocystenemia. No prior CAD/CVD. 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       bp meds reviewed and are accurate as below. Has normal renal function   Lab Results   Component Value Date     06/12/2018    K 4.7 06/12/2018    BUN 19 06/12/2018    CREATININE 0.9 06/12/2018          Patient Active Problem List   Diagnosis    Sarcoidosis of lung- Dr Denton Leon loss of right eye- partial.  cause unknown. 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  Calcification of abdominal aorta (HCC)    Eczema    S/P colonoscopy with polypectomy-12/8/16; [facility]; multiple polyps. recall 3 yrs.  Type 2 diabetes mellitus with diabetic polyneuropathy, without long-term current use of insulin (HCC)    External hemorrhoids      Body mass index is 27.34 kg/m².     Wt Readings from Last 3 Encounters:   04/23/19 196 lb (88.9 kg)   02/08/19 193 lb 3.2 oz (87.6 kg)   10/23/18 191 lb (86.6 kg)      BP Readings from Last 3 Encounters:   04/23/19 122/64   02/08/19 (!) 122/52   10/23/18 124/62      Current Outpatient Medications   Medication Sig Dispense Refill    atorvastatin (LIPITOR) 40 MG tablet TAKE ONE TABLET BY MOUTH DAILY 30 tablet 5    FREESTYLE LITE strip USE ONE STRIP TO TEST ONCE DAILY AS NEEDED 50 strip 4    FREESTYLE LANCETS MISC USE DAILY 100 each 5    hydrocortisone (PROCTOZONE-HC) 2.5 % rectal cream PLACE RECTALLY TWO TIMES A DAY AS NEEDED 30 g 1    L-Methylfolate-B6-B12 (FOLBIC RF) 1.13-25-2 MG TABS Take 2 tablets by mouth daily 60 tablet 11    aspirin (ASPIRIN CHILDRENS) 81 MG chewable tablet Take 1 tablet by mouth daily  0    hydrochlorothiazide (MICROZIDE) 12.5 MG capsule TAKE ONE CAPSULE BY MOUTH DAILY 30 capsule 5    amLODIPine (NORVASC) 2.5 MG tablet TAKE ONE TABLET BY MOUTH DAILY 30 tablet 5    atorvastatin (LIPITOR) 40 MG tablet TAKE ONE TABLET BY MOUTH DAILY 30 tablet 5    finasteride (PROSCAR) 5 MG tablet Take 5 mg by mouth daily      hydrocortisone (WESTCORT) 0.2 % cream Apply topically 2 times daily. 15 g 0    glucose monitoring kit (FREESTYLE) monitoring kit 1 kit by Does not apply route daily as needed 1 kit 0    tamsulosin (FLOMAX) 0.4 MG capsule Take 0.4 mg by mouth daily      polyethylene glycol (GLYCOLAX) powder Take 17 g by mouth daily      Cholecalciferol (VITAMIN D) 2000 UNITS CAPS capsule Take 4,000 Units by mouth daily      Misc Natural Products (GLUCOSAMINE CHOND COMPLEX/MSM PO) Take 1,500 mg by mouth daily        No current facility-administered medications for this visit. Immunization History   Administered Date(s) Administered    Hib, unspecified formulation 05/19/2014    Influenza Vaccine, unspecified formulation 10/10/2016    Influenza, High Dose (Fluzone 65 yrs and older) 10/09/2013, 11/07/2014, 09/03/2018    Meningococcal MPSV4 (Menomune) 05/19/2014    Pneumococcal 13-valent Conjugate ([id number]) 02/23/2016    Pneumococcal Polysaccharide ([id number]) 01/08/2013, 05/19/2014        Social History     Tobacco Use    Smoking status: Never Smoker    Smokeless tobacco: Never Used   Substance Use Topics    Alcohol use: Yes     Alcohol/week: 0.0 oz     Comment: socially    Drug use: No        Review of Systems No chest pains, dizziness, heart palpitations, dyspnea, lightheadedness, worsening edema. Objective:   Physical Exam   Constitutional: He is oriented to person, place, and time. He appears well-developed and well-nourished. Neck: Normal range of motion. Neck supple. Carotid bruit is not present. No thyromegaly present. Cardiovascular: Normal rate, regular rhythm, normal heart sounds and intact distal pulses. No murmur heard. Pulmonary/Chest: Effort normal and breath sounds normal. No respiratory distress. He has no wheezes. He has no rales. He exhibits no tenderness. Musculoskeletal: He exhibits edema (mild LE edema r>l). Feet normal; no lesions. Sensation intact to SW monofilament and vibratory sense bilaterally with intact pulses. Neurological: He is alert and oriented to person, place, and time. Skin: Skin is warm and dry. Psychiatric: He has a normal mood and affect. His behavior is normal. Judgment and thought content normal.   Nursing note and vitals reviewed. Assessment:      1. Type 2 diabetes mellitus with diabetic polyneuropathy, without long-term current use of insulin (HCC)  - POCT glycosylated hemoglobin (Hb A1C) is in non-diabetic range now. 5.6. Continue to  Monitor.   - Microalbumin / Creatinine Urine Ratio; Future  -  DIABETES FOOT EXAM    2. Hyperhomocysteinemia ([facility] [address]- 12  (11/14)  - he is happy to continue B vitamins for this- may prevent future venous and artrial diseases. 3. Dyslipidemia  - Stable; continue atorva. - Lipid Panel; Future    4. Sarcoidosis of lung- Dr Lesli Ruby  - is asymtpomatic; does not intend to see pulm    5. Calcification of abdominal aorta (HCC)  - he is happy to remain on asa 81 despite paucity of evidence for this specifically. 6. Essential hypertension  - Blood pressure is at goal on current therapy; continue meds and monitoring. Regular physical activity and healthy diet (low sodium, multiple servings of produce daily) was recommended. Renal function to be assessed yearly.   - COMPREHENSIVE METABOLIC PANEL; Future    7. Neuroendocrine tumor of pancreas  - no sign of recurrence per Dr Nahed Lopez at [facility].    8. S/P splenectomy  - he declines vaccines to encapsulated organisms. But he will think about it. I told him we could give the Pneumovax in the hip due to prior swelling. Plan: Fu 6 mo.     He was advised to consider sleep eval for his chronic fatigue.         Beronica Almonte MD

## 2019-04-29 DIAGNOSIS — I10 ESSENTIAL HYPERTENSION: ICD-10-CM

## 2019-04-29 DIAGNOSIS — E11.42 TYPE 2 DIABETES MELLITUS WITH DIABETIC POLYNEUROPATHY, WITHOUT LONG-TERM CURRENT USE OF INSULIN (HCC): ICD-10-CM

## 2019-04-29 DIAGNOSIS — E78.5 DYSLIPIDEMIA: ICD-10-CM

## 2019-04-29 LAB
A/G RATIO: 1.6 (ref 1.1–2.2)
ALBUMIN SERPL-MCNC: 4.4 G/DL (ref 3.4–5)
ALP BLD-CCNC: 88 U/L (ref 40–129)
ALT SERPL-CCNC: 30 U/L (ref 10–40)
ANION GAP SERPL CALCULATED.3IONS-SCNC: 9 MMOL/L (ref 3–16)
AST SERPL-CCNC: 23 U/L (ref 15–37)
BILIRUB SERPL-MCNC: 0.7 MG/DL (ref 0–1)
BUN BLDV-MCNC: 19 MG/DL (ref 7–20)
CALCIUM SERPL-MCNC: 10 MG/DL (ref 8.3–10.6)
CHLORIDE BLD-SCNC: 104 MMOL/L (ref 99–110)
CHOLESTEROL, TOTAL: 120 MG/DL (ref 0–199)
CO2: 29 MMOL/L (ref 21–32)
CREAT SERPL-MCNC: 1 MG/DL (ref 0.8–1.3)
CREATININE URINE: 151.3 MG/DL (ref 39–259)
GFR AFRICAN AMERICAN: >60
GFR NON-AFRICAN AMERICAN: >60
GLOBULIN: 2.8 G/DL
GLUCOSE BLD-MCNC: 104 MG/DL (ref 70–99)
HDLC SERPL-MCNC: 63 MG/DL (ref 40–60)
LDL CHOLESTEROL CALCULATED: 48 MG/DL
MICROALBUMIN UR-MCNC: <1.2 MG/DL
MICROALBUMIN/CREAT UR-RTO: NORMAL MG/G (ref 0–30)
POTASSIUM SERPL-SCNC: 4.5 MMOL/L (ref 3.5–5.1)
SODIUM BLD-SCNC: 142 MMOL/L (ref 136–145)
TOTAL PROTEIN: 7.2 G/DL (ref 6.4–8.2)
TRIGL SERPL-MCNC: 44 MG/DL (ref 0–150)
VLDLC SERPL CALC-MCNC: 9 MG/DL

## 2019-10-22 ENCOUNTER — OFFICE VISIT (OUTPATIENT)
Dept: FAMILY MEDICINE CLINIC | Age: 79
End: 2019-10-22
Payer: MEDICARE

## 2019-10-22 VITALS
SYSTOLIC BLOOD PRESSURE: 116 MMHG | WEIGHT: 201 LBS | TEMPERATURE: 98 F | OXYGEN SATURATION: 98 % | DIASTOLIC BLOOD PRESSURE: 70 MMHG | BODY MASS INDEX: 28.14 KG/M2 | HEART RATE: 58 BPM | HEIGHT: 71 IN

## 2019-10-22 DIAGNOSIS — E11.42 TYPE 2 DIABETES MELLITUS WITH DIABETIC POLYNEUROPATHY, WITHOUT LONG-TERM CURRENT USE OF INSULIN (HCC): Primary | ICD-10-CM

## 2019-10-22 DIAGNOSIS — I10 ESSENTIAL HYPERTENSION: ICD-10-CM

## 2019-10-22 LAB — HBA1C MFR BLD: 5.9 %

## 2019-10-22 PROCEDURE — 83036 HEMOGLOBIN GLYCOSYLATED A1C: CPT | Performed by: FAMILY MEDICINE

## 2019-10-22 PROCEDURE — G8482 FLU IMMUNIZE ORDER/ADMIN: HCPCS | Performed by: FAMILY MEDICINE

## 2019-10-22 PROCEDURE — G8598 ASA/ANTIPLAT THER USED: HCPCS | Performed by: FAMILY MEDICINE

## 2019-10-22 PROCEDURE — G8427 DOCREV CUR MEDS BY ELIG CLIN: HCPCS | Performed by: FAMILY MEDICINE

## 2019-10-22 PROCEDURE — G8417 CALC BMI ABV UP PARAM F/U: HCPCS | Performed by: FAMILY MEDICINE

## 2019-10-22 PROCEDURE — 4040F PNEUMOC VAC/ADMIN/RCVD: CPT | Performed by: FAMILY MEDICINE

## 2019-10-22 PROCEDURE — 1123F ACP DISCUSS/DSCN MKR DOCD: CPT | Performed by: FAMILY MEDICINE

## 2019-10-22 PROCEDURE — 1036F TOBACCO NON-USER: CPT | Performed by: FAMILY MEDICINE

## 2019-10-22 PROCEDURE — 99213 OFFICE O/P EST LOW 20 MIN: CPT | Performed by: FAMILY MEDICINE

## 2020-04-01 ENCOUNTER — TELEPHONE (OUTPATIENT)
Dept: FAMILY MEDICINE CLINIC | Age: 80
End: 2020-04-01

## 2020-04-01 NOTE — TELEPHONE ENCOUNTER
Called patient, informed him of this, he said that is good. He will call back end of May for an appt in June.

## 2020-05-27 RX ORDER — AMLODIPINE BESYLATE 2.5 MG/1
TABLET ORAL
Qty: 90 TABLET | Refills: 0 | Status: SHIPPED | OUTPATIENT
Start: 2020-05-27 | End: 2020-08-25

## 2020-06-09 ENCOUNTER — VIRTUAL VISIT (OUTPATIENT)
Dept: FAMILY MEDICINE CLINIC | Age: 80
End: 2020-06-09
Payer: MEDICARE

## 2020-06-09 PROCEDURE — 99443 PR PHYS/QHP TELEPHONE EVALUATION 21-30 MIN: CPT | Performed by: FAMILY MEDICINE

## 2020-06-09 ASSESSMENT — PATIENT HEALTH QUESTIONNAIRE - PHQ9
2. FEELING DOWN, DEPRESSED OR HOPELESS: 1
SUM OF ALL RESPONSES TO PHQ QUESTIONS 1-9: 1
SUM OF ALL RESPONSES TO PHQ QUESTIONS 1-9: 1
1. LITTLE INTEREST OR PLEASURE IN DOING THINGS: 0
SUM OF ALL RESPONSES TO PHQ9 QUESTIONS 1 & 2: 1

## 2020-06-09 NOTE — Clinical Note
F/u in November. He has an appt for later this month for something, can we make that an AWV? He is OK with that he told me.

## 2020-06-09 NOTE — PROGRESS NOTES
followed there. 1. Essential hypertension  - bp appears stable by home testing and prior visits. Continue norvasc and hctz 12.5.    - cmp ordered for monitoring. 2. Type 2 diabetes mellitus with diabetic polyneuropathy, without long-term current use of insulin (HCC)  - appears stable. Home glucose monitoring to goal.  - recheck a1c with upcoming labs. 3. Hyperhomocysteinemia ([address]- 12  (11/14)  - on daily  Folbic. Last level was 11 in 2018. 4. Calcification of abdominal aorta (HCC)  - no prior mi/cva. Continue asa 81 and atorva. chekcing lipids with upcoming labs. Due to prior splenectomy, will update vaccines at next visit. I affirm this is a Patient Initiated Episode with a Patient who has not had a related appointment within my department in the past 7 days or scheduled within the next 24 hours. Patient identification was verified at the start of the visit: Yes    Total Time: minutes: 21-30 minutes.     Note: not billable if this call serves to triage the patient into an appointment for the relevant concern      Jeana Mccartney

## 2020-06-11 DIAGNOSIS — E78.5 DYSLIPIDEMIA: ICD-10-CM

## 2020-06-11 DIAGNOSIS — I10 ESSENTIAL HYPERTENSION: ICD-10-CM

## 2020-06-11 DIAGNOSIS — E11.42 TYPE 2 DIABETES MELLITUS WITH DIABETIC POLYNEUROPATHY, WITHOUT LONG-TERM CURRENT USE OF INSULIN (HCC): ICD-10-CM

## 2020-06-11 LAB
A/G RATIO: 1.7 (ref 1.1–2.2)
ALBUMIN SERPL-MCNC: 4.6 G/DL (ref 3.4–5)
ALP BLD-CCNC: 82 U/L (ref 40–129)
ALT SERPL-CCNC: 41 U/L (ref 10–40)
ANION GAP SERPL CALCULATED.3IONS-SCNC: 9 MMOL/L (ref 3–16)
AST SERPL-CCNC: 30 U/L (ref 15–37)
BILIRUB SERPL-MCNC: 0.8 MG/DL (ref 0–1)
BUN BLDV-MCNC: 19 MG/DL (ref 7–20)
CALCIUM SERPL-MCNC: 9.7 MG/DL (ref 8.3–10.6)
CHLORIDE BLD-SCNC: 105 MMOL/L (ref 99–110)
CHOLESTEROL, TOTAL: 119 MG/DL (ref 0–199)
CO2: 26 MMOL/L (ref 21–32)
CREAT SERPL-MCNC: 0.9 MG/DL (ref 0.8–1.3)
CREATININE URINE: 151 MG/DL (ref 39–259)
GFR AFRICAN AMERICAN: >60
GFR NON-AFRICAN AMERICAN: >60
GLOBULIN: 2.7 G/DL
GLUCOSE BLD-MCNC: 103 MG/DL (ref 70–99)
HDLC SERPL-MCNC: 63 MG/DL (ref 40–60)
LDL CHOLESTEROL CALCULATED: 46 MG/DL
MICROALBUMIN UR-MCNC: <1.2 MG/DL
MICROALBUMIN/CREAT UR-RTO: NORMAL MG/G (ref 0–30)
POTASSIUM SERPL-SCNC: 4.9 MMOL/L (ref 3.5–5.1)
SODIUM BLD-SCNC: 140 MMOL/L (ref 136–145)
TOTAL PROTEIN: 7.3 G/DL (ref 6.4–8.2)
TRIGL SERPL-MCNC: 49 MG/DL (ref 0–150)
VLDLC SERPL CALC-MCNC: 10 MG/DL

## 2020-06-12 LAB
ESTIMATED AVERAGE GLUCOSE: 114 MG/DL
HBA1C MFR BLD: 5.6 %

## 2020-07-20 RX ORDER — LANCETS 28 GAUGE
EACH MISCELLANEOUS
Qty: 100 EACH | Refills: 1 | Status: SHIPPED | OUTPATIENT
Start: 2020-07-20

## 2020-07-20 RX ORDER — BLOOD-GLUCOSE METER
KIT MISCELLANEOUS
Qty: 100 STRIP | Refills: 1 | Status: SHIPPED | OUTPATIENT
Start: 2020-07-20 | End: 2021-11-23

## 2020-08-25 RX ORDER — AMLODIPINE BESYLATE 2.5 MG/1
TABLET ORAL
Qty: 90 TABLET | Refills: 0 | Status: SHIPPED | OUTPATIENT
Start: 2020-08-25 | End: 2020-11-20

## 2020-08-25 RX ORDER — HYDROCHLOROTHIAZIDE 12.5 MG/1
CAPSULE, GELATIN COATED ORAL
Qty: 90 CAPSULE | Refills: 1 | Status: SHIPPED | OUTPATIENT
Start: 2020-08-25 | End: 2021-03-08

## 2020-11-09 ENCOUNTER — OFFICE VISIT (OUTPATIENT)
Dept: FAMILY MEDICINE CLINIC | Age: 80
End: 2020-11-09
Payer: MEDICARE

## 2020-11-09 VITALS
HEIGHT: 71 IN | BODY MASS INDEX: 28.22 KG/M2 | TEMPERATURE: 97.7 F | HEART RATE: 60 BPM | SYSTOLIC BLOOD PRESSURE: 122 MMHG | WEIGHT: 201.6 LBS | OXYGEN SATURATION: 97 % | DIASTOLIC BLOOD PRESSURE: 74 MMHG | RESPIRATION RATE: 14 BRPM

## 2020-11-09 LAB — HBA1C MFR BLD: 5.6 %

## 2020-11-09 PROCEDURE — 1123F ACP DISCUSS/DSCN MKR DOCD: CPT | Performed by: FAMILY MEDICINE

## 2020-11-09 PROCEDURE — 1036F TOBACCO NON-USER: CPT | Performed by: FAMILY MEDICINE

## 2020-11-09 PROCEDURE — 83036 HEMOGLOBIN GLYCOSYLATED A1C: CPT | Performed by: FAMILY MEDICINE

## 2020-11-09 PROCEDURE — G8484 FLU IMMUNIZE NO ADMIN: HCPCS | Performed by: FAMILY MEDICINE

## 2020-11-09 PROCEDURE — 4040F PNEUMOC VAC/ADMIN/RCVD: CPT | Performed by: FAMILY MEDICINE

## 2020-11-09 PROCEDURE — G8417 CALC BMI ABV UP PARAM F/U: HCPCS | Performed by: FAMILY MEDICINE

## 2020-11-09 PROCEDURE — 99214 OFFICE O/P EST MOD 30 MIN: CPT | Performed by: FAMILY MEDICINE

## 2020-11-09 PROCEDURE — G8427 DOCREV CUR MEDS BY ELIG CLIN: HCPCS | Performed by: FAMILY MEDICINE

## 2020-11-09 RX ORDER — HYDROCORTISONE 25 MG/G
CREAM TOPICAL 2 TIMES DAILY PRN
Qty: 28 G | Refills: 1 | Status: SHIPPED | OUTPATIENT
Start: 2020-11-09 | End: 2022-05-10 | Stop reason: SDUPTHER

## 2020-11-09 NOTE — PROGRESS NOTES
Subjective:      Patient ID: Araceli Huggins is a 67 y.o. male. Blood pressure 122/74, pulse 60, temperature 97.7 °F (36.5 °C), resp. rate 14, height 5' 11\" (1.803 m), weight 201 lb 9.6 oz (91.4 kg), SpO2 97 %. HPI   Chief Complaint   Patient presents with    Diabetes      doing well. No major concerns. He has occ hemorrhoids and wants HC cream refill. They do not bleed. But can flare up from time to time. He uses miralax daily to keep stools soft and easy to pass. DM-2: no complications. No painful DPN, but 'a few tingles'  He follows with Dr Dariel Adams for foot care. a1c- 5.6 today. without meds now- all meds stopped. Lab Results   Component Value Date    LABA1C 5.6 11/09/2020    LABA1C 5.6 06/11/2020    LABA1C 5.9 10/22/2019      HTN: on norvasc and microzide. No SE's. Last renal function test:   Lab Results   Component Value Date     06/11/2020    K 4.9 06/11/2020    BUN 19 06/11/2020    CREATININE 0.9 06/11/2020        Takes atorva and asa for aortic atherosclerosis without any known symptomatic disease. Lab Results   Component Value Date    CHOL 119 06/11/2020    TRIG 49 06/11/2020    HDL 63 (H) 06/11/2020    LDLCALC 46 06/11/2020     Lab Results   Component Value Date    ALT 41 (H) 06/11/2020    AST 30 06/11/2020        Still on flomax for bph. No problems there. Hx sarcoidosis: no longer follows with pulm due to prolonged asymptomatic. Used to see Dr Betzy Anthony. No problems breathing. Remains active at home doing chores. Walks for exercise. Pancreas tumor: released from follow up from [address], [address], [address]. He feels his mood is good. No longer depressed or anxious. Sleeping well. Sees Dr Citlaly Kennedy for prostate care. 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  cause unknown.    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 Immunization History   Administered Date(s) Administered    Hib, unspecified 05/19/2014    Influenza Vaccine, unspecified formulation 10/10/2016    Influenza, High Dose (Fluzone 65 yrs and older) 10/09/2013, 11/07/2014, 09/03/2018, 09/09/2019    Meningococcal MPSV4 (Menomune) 05/19/2014    Pneumococcal Conjugate 13-valent (Hanefsj21) 02/23/2016    Pneumococcal Polysaccharide ([id number]) 01/08/2013, 05/19/2014        Social History     Tobacco Use    Smoking status: Never Smoker    Smokeless tobacco: Never Used   Substance Use Topics    Alcohol use: Yes     Alcohol/week: 0.0 standard drinks     Comment: socially    Drug use: No      Review of Systems As above     Objective:   Physical Exam  Vitals signs and nursing note reviewed. Constitutional:       General: He is not in acute distress. Appearance: Normal appearance. He is well-developed. He is not diaphoretic. HENT:      Head: Normocephalic and atraumatic. Right Ear: Tympanic membrane and ear canal normal.      Left Ear: Tympanic membrane and ear canal normal.      Nose: Nose normal.      Mouth/Throat:      Comments: Left tongue with white adherent plaque with several projections. Neck:      Musculoskeletal: Normal range of motion and neck supple. Cardiovascular:      Rate and Rhythm: Normal rate and regular rhythm. Pulses: Normal pulses. Heart sounds: Normal heart sounds. No murmur. Pulmonary:      Effort: Pulmonary effort is normal. No respiratory distress. Breath sounds: Normal breath sounds. No wheezing or rales. Musculoskeletal:      Right lower leg: No edema.       Left lower leg: Edema (2+ edema LLE (static)) present. Lymphadenopathy:      Cervical: No cervical adenopathy. Skin:     General: Skin is warm and dry. Neurological:      General: No focal deficit present. Mental Status: He is alert and oriented to person, place, and time. Mental status is at baseline. Psychiatric:         Mood and Affect: Mood normal.         Behavior: Behavior normal.         Thought Content: Thought content normal.         Judgment: Judgment normal.         Assessment:      1. Type 2 diabetes mellitus with diabetic polyneuropathy, without long-term current use of insulin (HCC)  - POCT glycosylated hemoglobin (Hb A1C) 5.6 today. Stable without meds. Continue to follow q6 months. 2. Unspecified mood (affective) disorder ([facility]  - Radha Alcaraz reports stable mood without Rx currently. 3. Sarcoidosis of lung ([facility]  - currently without significant resp symptoms. He elects to NOT follow up with pulm at this time. 4. Other malignant neuroendocrine tumors ([facility] 2014  - now more than 5 years out and Dr Lynn Najera states he only needs to follow up PRN. 5. Hairy leukoplakia of tongue  - likely viral. Observe for resolution over next couple months. 6. Essential hypertension  - Blood pressure is at goal on current therapy; continue meds and monitoring. Regular physical activity and healthy diet (low sodium, multiple servings of produce daily) was recommended. Renal function to be assessed yearly. Plan:      F/;u 6 mo unless he has a problem.         Nichole tSanford MD

## 2020-11-11 PROBLEM — F39 UNSPECIFIED MOOD (AFFECTIVE) DISORDER (HCC): Status: ACTIVE | Noted: 2020-11-11

## 2020-11-11 PROBLEM — C7A.8 OTHER MALIGNANT NEUROENDOCRINE TUMORS (HCC): Status: ACTIVE | Noted: 2020-11-11

## 2020-11-13 ENCOUNTER — TELEPHONE (OUTPATIENT)
Dept: FAMILY MEDICINE CLINIC | Age: 80
End: 2020-11-13

## 2020-11-16 DIAGNOSIS — K13.3 HAIRY LEUKOPLAKIA OF TONGUE: ICD-10-CM

## 2020-11-16 LAB
BASOPHILS ABSOLUTE: 0 K/UL (ref 0–0.2)
BASOPHILS RELATIVE PERCENT: 0.6 %
EOSINOPHILS ABSOLUTE: 0.2 K/UL (ref 0–0.6)
EOSINOPHILS RELATIVE PERCENT: 3.2 %
HCT VFR BLD CALC: 38.2 % (ref 40.5–52.5)
HEMOGLOBIN: 13.2 G/DL (ref 13.5–17.5)
LYMPHOCYTES ABSOLUTE: 2 K/UL (ref 1–5.1)
LYMPHOCYTES RELATIVE PERCENT: 32.8 %
MCH RBC QN AUTO: 32.4 PG (ref 26–34)
MCHC RBC AUTO-ENTMCNC: 34.5 G/DL (ref 31–36)
MCV RBC AUTO: 94 FL (ref 80–100)
MONOCYTES ABSOLUTE: 0.9 K/UL (ref 0–1.3)
MONOCYTES RELATIVE PERCENT: 14.7 %
NEUTROPHILS ABSOLUTE: 3 K/UL (ref 1.7–7.7)
NEUTROPHILS RELATIVE PERCENT: 48.7 %
PDW BLD-RTO: 14.4 % (ref 12.4–15.4)
PLATELET # BLD: 209 K/UL (ref 135–450)
PMV BLD AUTO: 9.1 FL (ref 5–10.5)
RBC # BLD: 4.07 M/UL (ref 4.2–5.9)
WBC # BLD: 6.1 K/UL (ref 4–11)

## 2020-11-20 RX ORDER — AMLODIPINE BESYLATE 2.5 MG/1
TABLET ORAL
Qty: 90 TABLET | Refills: 1 | Status: SHIPPED | OUTPATIENT
Start: 2020-11-20 | End: 2021-04-08

## 2021-03-08 RX ORDER — HYDROCHLOROTHIAZIDE 12.5 MG/1
CAPSULE, GELATIN COATED ORAL
Qty: 90 CAPSULE | Refills: 0 | Status: SHIPPED | OUTPATIENT
Start: 2021-03-08 | End: 2021-06-07

## 2021-05-10 ENCOUNTER — OFFICE VISIT (OUTPATIENT)
Dept: FAMILY MEDICINE CLINIC | Age: 81
End: 2021-05-10
Payer: MEDICARE

## 2021-05-10 VITALS
HEART RATE: 60 BPM | DIASTOLIC BLOOD PRESSURE: 78 MMHG | OXYGEN SATURATION: 98 % | BODY MASS INDEX: 28.42 KG/M2 | SYSTOLIC BLOOD PRESSURE: 128 MMHG | HEIGHT: 71 IN | WEIGHT: 203 LBS

## 2021-05-10 DIAGNOSIS — Z90.81 S/P SPLENECTOMY: ICD-10-CM

## 2021-05-10 DIAGNOSIS — I10 ESSENTIAL HYPERTENSION: ICD-10-CM

## 2021-05-10 DIAGNOSIS — C7A.8 OTHER MALIGNANT NEUROENDOCRINE TUMORS (HCC): ICD-10-CM

## 2021-05-10 DIAGNOSIS — D86.0 SARCOIDOSIS OF LUNG (HCC): ICD-10-CM

## 2021-05-10 DIAGNOSIS — F39 UNSPECIFIED MOOD (AFFECTIVE) DISORDER (HCC): ICD-10-CM

## 2021-05-10 DIAGNOSIS — E78.5 DYSLIPIDEMIA: ICD-10-CM

## 2021-05-10 DIAGNOSIS — I70.0 CALCIFICATION OF ABDOMINAL AORTA (HCC): ICD-10-CM

## 2021-05-10 DIAGNOSIS — E72.11 HYPERHOMOCYSTEINEMIA (HCC): ICD-10-CM

## 2021-05-10 DIAGNOSIS — E11.42 TYPE 2 DIABETES MELLITUS WITH DIABETIC POLYNEUROPATHY, WITHOUT LONG-TERM CURRENT USE OF INSULIN (HCC): Primary | ICD-10-CM

## 2021-05-10 DIAGNOSIS — E11.42 TYPE 2 DIABETES MELLITUS WITH DIABETIC POLYNEUROPATHY, WITHOUT LONG-TERM CURRENT USE OF INSULIN (HCC): ICD-10-CM

## 2021-05-10 LAB
A/G RATIO: 1.7 (ref 1.1–2.2)
ALBUMIN SERPL-MCNC: 4.5 G/DL (ref 3.4–5)
ALP BLD-CCNC: 73 U/L (ref 40–129)
ALT SERPL-CCNC: 31 U/L (ref 10–40)
ANION GAP SERPL CALCULATED.3IONS-SCNC: 11 MMOL/L (ref 3–16)
AST SERPL-CCNC: 29 U/L (ref 15–37)
BILIRUB SERPL-MCNC: 0.7 MG/DL (ref 0–1)
BUN BLDV-MCNC: 17 MG/DL (ref 7–20)
CALCIUM SERPL-MCNC: 9.6 MG/DL (ref 8.3–10.6)
CHLORIDE BLD-SCNC: 106 MMOL/L (ref 99–110)
CHOLESTEROL, TOTAL: 122 MG/DL (ref 0–199)
CO2: 26 MMOL/L (ref 21–32)
CREAT SERPL-MCNC: 0.9 MG/DL (ref 0.8–1.3)
CREATININE URINE: 130.5 MG/DL (ref 39–259)
GFR AFRICAN AMERICAN: >60
GFR NON-AFRICAN AMERICAN: >60
GLOBULIN: 2.7 G/DL
GLUCOSE BLD-MCNC: 108 MG/DL (ref 70–99)
HBA1C MFR BLD: 5.7 %
HCT VFR BLD CALC: 39.8 % (ref 40.5–52.5)
HDLC SERPL-MCNC: 64 MG/DL (ref 40–60)
HEMOGLOBIN: 13.3 G/DL (ref 13.5–17.5)
LDL CHOLESTEROL CALCULATED: 47 MG/DL
MCH RBC QN AUTO: 32.3 PG (ref 26–34)
MCHC RBC AUTO-ENTMCNC: 33.5 G/DL (ref 31–36)
MCV RBC AUTO: 96.3 FL (ref 80–100)
MICROALBUMIN UR-MCNC: <1.2 MG/DL
MICROALBUMIN/CREAT UR-RTO: NORMAL MG/G (ref 0–30)
PDW BLD-RTO: 14.5 % (ref 12.4–15.4)
PLATELET # BLD: 193 K/UL (ref 135–450)
PMV BLD AUTO: 9.2 FL (ref 5–10.5)
POTASSIUM SERPL-SCNC: 4.7 MMOL/L (ref 3.5–5.1)
RBC # BLD: 4.13 M/UL (ref 4.2–5.9)
SODIUM BLD-SCNC: 143 MMOL/L (ref 136–145)
TOTAL PROTEIN: 7.2 G/DL (ref 6.4–8.2)
TRIGL SERPL-MCNC: 54 MG/DL (ref 0–150)
VLDLC SERPL CALC-MCNC: 11 MG/DL
WBC # BLD: 5.4 K/UL (ref 4–11)

## 2021-05-10 PROCEDURE — 1123F ACP DISCUSS/DSCN MKR DOCD: CPT | Performed by: FAMILY MEDICINE

## 2021-05-10 PROCEDURE — 4040F PNEUMOC VAC/ADMIN/RCVD: CPT | Performed by: FAMILY MEDICINE

## 2021-05-10 PROCEDURE — G8417 CALC BMI ABV UP PARAM F/U: HCPCS | Performed by: FAMILY MEDICINE

## 2021-05-10 PROCEDURE — 1036F TOBACCO NON-USER: CPT | Performed by: FAMILY MEDICINE

## 2021-05-10 PROCEDURE — G8427 DOCREV CUR MEDS BY ELIG CLIN: HCPCS | Performed by: FAMILY MEDICINE

## 2021-05-10 PROCEDURE — 99214 OFFICE O/P EST MOD 30 MIN: CPT | Performed by: FAMILY MEDICINE

## 2021-05-10 PROCEDURE — 83036 HEMOGLOBIN GLYCOSYLATED A1C: CPT | Performed by: FAMILY MEDICINE

## 2021-05-10 ASSESSMENT — PATIENT HEALTH QUESTIONNAIRE - PHQ9
2. FEELING DOWN, DEPRESSED OR HOPELESS: 0
1. LITTLE INTEREST OR PLEASURE IN DOING THINGS: 0
SUM OF ALL RESPONSES TO PHQ QUESTIONS 1-9: 0
SUM OF ALL RESPONSES TO PHQ9 QUESTIONS 1 & 2: 0

## 2021-05-10 NOTE — PROGRESS NOTES
5/10/2021    Blood pressure 128/78, pulse 60, height 5' 11\" (1.803 m), weight 203 lb (92.1 kg), SpO2 98 %. Petros Hilliard (:  1940) is a 79 y.o. male, here for evaluation of the following medical concerns:    Chief Complaint   Patient presents with    Follow-up     dm f/u , and fasting labs     Routing check up for DM    DM-2: no meds. Still adheres strictly to reduced carb and calorie diet. A1c today is 5.7%  (he was concerned because he thinks he gained some weight)  No known complications. debho is at [employer] in . He does yard work and chores for exercise. Likes to take walks. Quite vigorous for 80. HTN: on hctz 12. 5.  norvasc 2.5. no SE's. Still has chronic puffy leg edema. He does not test at home. No hx of CAD, TIA, CVA or PVD. Last renal function test:   Lab Results   Component Value Date     2020    K 4.9 2020    BUN 19 2020    CREATININE 0.9 2020        He is fasting for lipid testing  Takes arorva. Lab Results   Component Value Date    CHOL 119 2020    TRIG 49 2020    HDL 63 (H) 2020    LDLCALC 46 2020     Lab Results   Component Value Date    ALT 41 (H) 2020    AST 30 2020        He uses folbic and asa for hyperhomocystinemia, aortic calcification. He no longer follows with [facility] for pancreas mass/tumor  He still sees Dr Clarke Grijalva for prostate  No longer follows with DR Jannet Navarrete for Sarcoidosis. No known complications. He has had covid vaccine- pfizer. He is an asplenic. He reports having periodic low back aches. At various times. After cutting grass SOMETIMES. Uses a heating pad and it improves. happens several times a year, does not radiate, resolves with home remedies and a little rest.    He has thumb arthritis, hands, hips also. Glucosamine helps hips, but not hands. He had a ganglion cyst R wrist- it resolved spontaneously.       Patient Active Problem List   Diagnosis    Sarcoidosis of lung- Dr Klaus Tidwell loss of right eye- partial.  cause unknown. 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  Dyslipidemia    Hyperhomocysteinemia ([address]- 12  (11/14)    Trimalleolar fracture of left ankle    Essential hypertension    Vitamin D deficiency    History of partial pancreatectomy- 3/14 Dr Kristel Orta     Mood disturbance [facility])- prolonged grief reaction- son's death.  Calcification of abdominal aorta (HCC)    Eczema    S/P colonoscopy with polypectomy-12/8/16; O'shira; multiple polyps. recall 3 yrs.  Type 2 diabetes mellitus with diabetic polyneuropathy, without long-term current use of insulin (HCC)    External hemorrhoids    Unspecified mood (affective) disorder (HCC)    Other malignant neuroendocrine tumors (HCC)        Body mass index is 28.31 kg/m². Wt Readings from Last 3 Encounters:   05/10/21 203 lb (92.1 kg)   11/09/20 201 lb 9.6 oz (91.4 kg)   10/22/19 201 lb (91.2 kg)       BP Readings from Last 3 Encounters:   05/10/21 128/78   11/09/20 122/74   10/22/19 116/70       Allergies   Allergen Reactions    Pneumovax [Pneumococcal Polysaccharide Vaccine] Swelling     Arm swelling x 3 days       Prior to Visit Medications    Medication Sig Taking?  Authorizing Provider   amLODIPine (NORVASC) 2.5 MG tablet TAKE ONE TABLET BY MOUTH DAILY Yes Melo Mcmahan MD   hydroCHLOROthiazide (MICROZIDE) 12.5 MG capsule TAKE ONE CAPSULE BY MOUTH DAILY Yes Melo Mcmahan MD   atorvastatin (LIPITOR) 40 MG tablet TAKE ONE TABLET BY MOUTH DAILY Yes Melo Mcmahan MD   hydrocortisone (PROCTOZONE-HC) 2.5 % CREA rectal cream Place rectally 2 times daily as needed for Hemorrhoids Yes Melo Mcmahan MD   FREESTYLE LITE strip USE ONE STRIP TO TEST DAILY AS NEEDED Yes Shauna Crabtree David Carroll MD   FreeStyle Lancets MISC USE ONE LANCET TO TEST DAILY Yes Lorri Darden MD   D-Hcczvgfqzins-[id number] THE [facility] RF) 1.13-25-2 MG TABS Take 2 tablets by mouth daily Yes Lorri Darden MD   aspirin (ASPIRIN CHILDRENS) 81 MG chewable tablet Take 1 tablet by mouth daily Yes Lorri Darden MD   finasteride (PROSCAR) 5 MG tablet Take 5 mg by mouth daily Yes Historical Provider, MD   hydrocortisone (WESTCORT) 0.2 % cream Apply topically 2 times daily. Yes Lorri Darden MD   glucose monitoring kit (FREESTYLE) monitoring kit 1 kit by Does not apply route daily as needed Yes Lorri Darden MD   tamsulosin (FLOMAX) 0.4 MG capsule Take 0.4 mg by mouth daily Yes Historical Provider, MD   polyethylene glycol (GLYCOLAX) powder Take 17 g by mouth daily Yes Historical Provider, MD   Cholecalciferol (VITAMIN D) 2000 UNITS CAPS capsule Take 4,000 Units by mouth daily Yes Historical Provider, MD   Misc Natural Products (GLUCOSAMINE CHOND COMPLEX/MSM PO) Take 1,500 mg by mouth daily  Yes Historical Provider, MD        Social History     Tobacco Use    Smoking status: Never Smoker    Smokeless tobacco: Never Used   Substance Use Topics    Alcohol use: Yes     Alcohol/week: 0.0 standard drinks     Comment: socially    Drug use: No       Review of Systems No chest pains, dizziness, heart palpitations, dyspnea, lightheadedness, worsening edema. Physical Exam  Vitals signs and nursing note reviewed. Constitutional:       General: He is not in acute distress. Appearance: Normal appearance. He is well-developed. He is not diaphoretic. Neck:      Musculoskeletal: Normal range of motion and neck supple. Cardiovascular:      Rate and Rhythm: Normal rate and regular rhythm. Pulses: Normal pulses. Heart sounds: Normal heart sounds. No murmur. Pulmonary:      Effort: Pulmonary effort is normal. No respiratory distress. Breath sounds: Normal breath sounds.  No wheezing or rales.   Musculoskeletal:      Comments: Mild bilat LE edema, L>R   Lymphadenopathy:      Cervical: No cervical adenopathy. Skin:     General: Skin is warm and dry. Neurological:      General: No focal deficit present. Mental Status: He is alert and oriented to person, place, and time. Mental status is at baseline. Psychiatric:         Mood and Affect: Mood normal.         Behavior: Behavior normal.         Thought Content: Thought content normal.         Judgment: Judgment normal.         ASSESSMENT/PLAN:    1. Type 2 diabetes mellitus with diabetic polyneuropathy, without long-term current use of insulin ([facility]  - diabetes under excellent control presently. Continue to monitor with diet alone. - POCT glycosylated hemoglobin (Hb A1C)  - Microalbumin / Creatinine Urine Ratio; Future    2. Essential hypertension  - Blood pressure is at goal on current therapy; continue meds and monitoring. Regular physical activity and healthy diet (low sodium, multiple servings of produce daily) was recommended. Renal function to be assessed yearly. - Comprehensive Metabolic Panel; Future    3. Unspecified mood (affective) disorder (HCC)  - mood stable without med support currently. 4. Calcification of abdominal aorta (HCC)  - no clinical CVD. Continue asa/statin    5. Sarcoidosis of lung ([facility]  - check cbc,but is otherwise asymptomatic and no longer wishes to see pulm. - CBC; Future    6. Hyperhomocysteinemia (HCC)  - no clinical CVD aside from aortic atheroscleoris. He is [facility] with continuing the [employer]. 7. Other malignant neuroendocrine tumors ([facility]  - he is released from f/u at [facility]. Dyslipidemia  - Lipid Panel; Future    9. S/P splenectomy  - check cbc, declines vaccines today. - CBC; Future      Follow up: 6 mo    An  [employer] was used to authenticate this note.     --Joesph Felipe MD on 5/10/2021 at 11:57 AM

## 2021-08-30 RX ORDER — ATORVASTATIN CALCIUM 40 MG/1
TABLET, FILM COATED ORAL
Qty: 90 TABLET | Refills: 1 | Status: SHIPPED | OUTPATIENT
Start: 2021-08-30 | End: 2022-02-28

## 2021-09-20 LAB — PROSTATE SPECIFIC ANTIGEN: 3.5 NG/ML (ref 0–4)

## 2021-11-08 ENCOUNTER — OFFICE VISIT (OUTPATIENT)
Dept: FAMILY MEDICINE CLINIC | Age: 81
End: 2021-11-08
Payer: MEDICARE

## 2021-11-08 VITALS
DIASTOLIC BLOOD PRESSURE: 70 MMHG | HEART RATE: 72 BPM | SYSTOLIC BLOOD PRESSURE: 124 MMHG | BODY MASS INDEX: 28.84 KG/M2 | HEIGHT: 71 IN | OXYGEN SATURATION: 98 % | WEIGHT: 206 LBS

## 2021-11-08 DIAGNOSIS — M18.11 PRIMARY OSTEOARTHRITIS OF FIRST CARPOMETACARPAL JOINT OF RIGHT HAND: ICD-10-CM

## 2021-11-08 DIAGNOSIS — E11.42 TYPE 2 DIABETES MELLITUS WITH DIABETIC POLYNEUROPATHY, WITHOUT LONG-TERM CURRENT USE OF INSULIN (HCC): Primary | ICD-10-CM

## 2021-11-08 DIAGNOSIS — I10 ESSENTIAL HYPERTENSION: ICD-10-CM

## 2021-11-08 DIAGNOSIS — Z90.81 S/P SPLENECTOMY: ICD-10-CM

## 2021-11-08 PROBLEM — F39 UNSPECIFIED MOOD (AFFECTIVE) DISORDER (HCC): Status: RESOLVED | Noted: 2020-11-11 | Resolved: 2021-11-08

## 2021-11-08 LAB — HBA1C MFR BLD: 6 %

## 2021-11-08 PROCEDURE — 83036 HEMOGLOBIN GLYCOSYLATED A1C: CPT | Performed by: FAMILY MEDICINE

## 2021-11-08 PROCEDURE — 4040F PNEUMOC VAC/ADMIN/RCVD: CPT | Performed by: FAMILY MEDICINE

## 2021-11-08 PROCEDURE — G8417 CALC BMI ABV UP PARAM F/U: HCPCS | Performed by: FAMILY MEDICINE

## 2021-11-08 PROCEDURE — 99214 OFFICE O/P EST MOD 30 MIN: CPT | Performed by: FAMILY MEDICINE

## 2021-11-08 PROCEDURE — 1036F TOBACCO NON-USER: CPT | Performed by: FAMILY MEDICINE

## 2021-11-08 PROCEDURE — G8484 FLU IMMUNIZE NO ADMIN: HCPCS | Performed by: FAMILY MEDICINE

## 2021-11-08 PROCEDURE — 1123F ACP DISCUSS/DSCN MKR DOCD: CPT | Performed by: FAMILY MEDICINE

## 2021-11-08 PROCEDURE — G8427 DOCREV CUR MEDS BY ELIG CLIN: HCPCS | Performed by: FAMILY MEDICINE

## 2021-11-08 NOTE — PROGRESS NOTES
2021    Blood pressure 124/70, pulse 72, height 5' 11\" (1.803 m), weight 206 lb (93.4 kg), SpO2 98 %. Birdie Severs (:  1940) is a 79 y.o. male, here for evaluation of the following medical concerns:    Chief Complaint   Patient presents with    Diabetes     f/u dm check     81th birthday coming up soon. Here for routine follow up of DM2. Diet controlled. His a1c's have been in normal range of late. Lab Results   Component Value Date    LABA1C 5.7 05/10/2021    LABA1C 5.6 2020    LABA1C 5.6 2020      He tracks his sugars about once a week or so. AM's are usually under 140. After meals are under 160. He uses norvasc 2.5, hctz 12.4. Last renal function test:   Lab Results   Component Value Date     05/10/2021    K 4.7 05/10/2021    BUN 17 05/10/2021    CREATININE 0.9 05/10/2021     CrCl cannot be calculated (Patient's most recent lab result is older than the maximum 120 days allowed. ). No chest pains, dizziness, heart palpitations, dyspnea, lightheadedness, worsening edema. Dr Edward Crouch treats him for BPH and uses flomax and finasteride both. Urine flow is good. He continues to be happy doing housework and yard work. Weight stable. [facility] through the woods a bit. No new problems. Last lipid test:  Lab Results   Component Value Date    CHOL 122 05/10/2021    TRIG 54 05/10/2021    HDL 64 (H) 05/10/2021    LDLCALC 47 05/10/2021     Lab Results   Component Value Date    ALT 31 05/10/2021    AST 29 05/10/2021     Has had initial covid vaccines. He is without a spleen. He does not want the vaccines (meningitis). DR Shira Moreno has released him from  on NET in pancreas tail. He experiences R thumb base pain from time to time. With use. No treatment. He has hx sarcoidosis of lungs  No longer follows with pulm. He remains asymptomatic without any pulm meds.       Patient Active Problem List   Diagnosis    Sarcoidosis of lung- Dr Radha Pittman loss of right eye- partial.  cause unknown. 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  Dyslipidemia    Hyperhomocysteinemia ([address]- 12  (11/14)    Trimalleolar fracture of left ankle    Essential hypertension    Vitamin D deficiency    History of partial pancreatectomy- 3/14 Dr Charity Pang     Mood disturbance [facility])- prolonged grief reaction- son's death.  Calcification of abdominal aorta (HCC)    Eczema    S/P colonoscopy with polypectomy-12/8/16; O'shira; multiple polyps. recall 3 yrs.  Type 2 diabetes mellitus with diabetic polyneuropathy, without long-term current use of insulin (HCC)    External hemorrhoids    Unspecified mood (affective) disorder (HCC)    Other malignant neuroendocrine tumors (HCC)        Body mass index is 28.73 kg/m². Wt Readings from Last 3 Encounters:   11/08/21 206 lb (93.4 kg)   05/10/21 203 lb (92.1 kg)   11/09/20 201 lb 9.6 oz (91.4 kg)       BP Readings from Last 3 Encounters:   11/08/21 124/70   05/10/21 128/78   11/09/20 122/74       Allergies   Allergen Reactions    Pneumovax [Pneumococcal Polysaccharide Vaccine] Swelling     Arm swelling x 3 days       Prior to Visit Medications    Medication Sig Taking?  Authorizing Provider   atorvastatin (LIPITOR) 40 MG tablet TAKE ONE TABLET BY MOUTH DAILY Yes Jim Vines MD   amLODIPine (NORVASC) 2.5 MG tablet TAKE ONE TABLET BY MOUTH DAILY Yes Jim Vines MD   hydroCHLOROthiazide (MICROZIDE) 12.5 MG capsule TAKE ONE CAPSULE BY MOUTH DAILY Yes Jim Vines MD   hydrocortisone (PROCTOZONE-HC) 2.5 % CREA rectal cream Place rectally 2 times daily as needed for Hemorrhoids Yes Jim Vines MD   FREESTYLE LITE strip USE ONE STRIP TO TEST DAILY AS NEEDED Yes Jim Vines MD   FreeStyle Lancets MISC USE ONE LANCET TO TEST DAILY Yes Gallo Dowell MD   [id number]-Ghchrpygwimt-[id number] THE [facility] RF) 1.13-25-2 MG TABS Take 2 tablets by mouth daily Yes Gallo Dowell MD   aspirin (ASPIRIN CHILDRENS) 81 MG chewable tablet Take 1 tablet by mouth daily Yes Gallo Dowell MD   finasteride (PROSCAR) 5 MG tablet Take 5 mg by mouth daily Yes Historical Provider, MD   hydrocortisone (WESTCORT) 0.2 % cream Apply topically 2 times daily. Yes Gallo Dowell MD   glucose monitoring kit (FREESTYLE) monitoring kit 1 kit by Does not apply route daily as needed Yes Gallo Dowell MD   tamsulosin (FLOMAX) 0.4 MG capsule Take 0.4 mg by mouth daily Yes Historical Provider, MD   polyethylene glycol (GLYCOLAX) powder Take 17 g by mouth daily Yes Historical Provider, MD   Cholecalciferol (VITAMIN D) 2000 UNITS CAPS capsule Take 4,000 Units by mouth daily Yes Historical Provider, MD   Misc Natural Products (GLUCOSAMINE CHOND COMPLEX/MSM PO) Take 1,500 mg by mouth daily  Yes Historical Provider, MD        Social History     Tobacco Use    Smoking status: Never Smoker    Smokeless tobacco: Never Used   Substance Use Topics    Alcohol use: Yes     Alcohol/week: 0.0 standard drinks     Comment: socially    Drug use: No       Review of Systems As above     Physical Exam  Vitals and nursing note reviewed. Constitutional:       General: He is not in acute distress. Appearance: Normal appearance. He is well-developed. He is not diaphoretic. Cardiovascular:      Rate and Rhythm: Normal rate and regular rhythm. Pulses: Normal pulses. Heart sounds: Normal heart sounds. No murmur heard. Pulmonary:      Effort: Pulmonary effort is normal. No respiratory distress. Breath sounds: Normal breath sounds. No wheezing or rales. Musculoskeletal:      Cervical back: Normal range of motion and neck supple. Right lower leg: No edema. Left lower leg: No edema. Lymphadenopathy:      Cervical: No cervical adenopathy. Skin:     General: Skin is warm and dry. Neurological:      General: No focal deficit present. Mental Status: He is alert and oriented to person, place, and time. Mental status is at baseline. Psychiatric:         Mood and Affect: Mood normal.         Behavior: Behavior normal.         Thought Content: Thought content normal.         Judgment: Judgment normal.         ASSESSMENT/PLAN:    1. Type 2 diabetes mellitus with diabetic polyneuropathy, without long-term current use of insulin (HCC)  - a1c controlled with diet. continue to monitor q 6 mo. - POCT glycosylated hemoglobin (Hb A1C) is 6.0 today. 2. Essential hypertension  - Blood pressure is at goal on current therapy; continue meds and monitoring. Regular physical activity and healthy diet (low sodium, multiple servings of produce daily) was recommended. Renal function to be assessed yearly. 3. S/P splenectomy  Shira Pain declines meningitis vaccines, understanding that an infection from such bacteria could prove fatal for him. 4. Primary osteoarthritis of first carpometacarpal joint of right hand  - discussed that he can use tylenol or voltaren gel as first line agents for pain on an as-needed basis. Return in about 6 months (around 5/8/2022) for follow up diabetes, follow up HTN, fasting labs. An  [employer] was used to authenticate this note.     --Erica Cabrera MD on 11/8/2021 at 11:49 AM

## 2021-11-24 ENCOUNTER — TELEPHONE (OUTPATIENT)
Dept: FAMILY MEDICINE CLINIC | Age: 81
End: 2021-11-24

## 2021-11-24 DIAGNOSIS — E11.42 TYPE 2 DIABETES MELLITUS WITH DIABETIC POLYNEUROPATHY, WITHOUT LONG-TERM CURRENT USE OF INSULIN (HCC): Primary | ICD-10-CM

## 2021-11-24 RX ORDER — BLOOD-GLUCOSE METER
KIT MISCELLANEOUS
Qty: 50 STRIP | Refills: 3 | Status: SHIPPED | OUTPATIENT
Start: 2021-11-24

## 2022-01-03 DIAGNOSIS — I10 ESSENTIAL HYPERTENSION: ICD-10-CM

## 2022-01-03 RX ORDER — AMLODIPINE BESYLATE 2.5 MG/1
TABLET ORAL
Qty: 90 TABLET | Refills: 1 | Status: SHIPPED | OUTPATIENT
Start: 2022-01-03 | End: 2022-06-27

## 2022-02-28 RX ORDER — ATORVASTATIN CALCIUM 40 MG/1
TABLET, FILM COATED ORAL
Qty: 90 TABLET | Refills: 1 | Status: SHIPPED | OUTPATIENT
Start: 2022-02-28 | End: 2022-08-24

## 2022-05-10 ENCOUNTER — OFFICE VISIT (OUTPATIENT)
Dept: FAMILY MEDICINE CLINIC | Age: 82
End: 2022-05-10
Payer: MEDICARE

## 2022-05-10 VITALS
DIASTOLIC BLOOD PRESSURE: 64 MMHG | HEART RATE: 63 BPM | HEIGHT: 71 IN | WEIGHT: 203 LBS | OXYGEN SATURATION: 97 % | BODY MASS INDEX: 28.42 KG/M2 | SYSTOLIC BLOOD PRESSURE: 118 MMHG

## 2022-05-10 DIAGNOSIS — Z90.81 S/P SPLENECTOMY: ICD-10-CM

## 2022-05-10 DIAGNOSIS — G89.29 CHRONIC PAIN OF RIGHT KNEE: ICD-10-CM

## 2022-05-10 DIAGNOSIS — I10 ESSENTIAL HYPERTENSION: ICD-10-CM

## 2022-05-10 DIAGNOSIS — E78.5 DYSLIPIDEMIA: ICD-10-CM

## 2022-05-10 DIAGNOSIS — M25.561 CHRONIC PAIN OF RIGHT KNEE: ICD-10-CM

## 2022-05-10 DIAGNOSIS — Z12.11 SCREEN FOR COLON CANCER: ICD-10-CM

## 2022-05-10 DIAGNOSIS — E11.42 TYPE 2 DIABETES MELLITUS WITH DIABETIC POLYNEUROPATHY, WITHOUT LONG-TERM CURRENT USE OF INSULIN (HCC): Primary | ICD-10-CM

## 2022-05-10 DIAGNOSIS — C7A.8 OTHER MALIGNANT NEUROENDOCRINE TUMORS (HCC): ICD-10-CM

## 2022-05-10 DIAGNOSIS — I70.0 CALCIFICATION OF ABDOMINAL AORTA (HCC): ICD-10-CM

## 2022-05-10 DIAGNOSIS — D86.0 SARCOIDOSIS OF LUNG (HCC): ICD-10-CM

## 2022-05-10 LAB
A/G RATIO: 1.7 (ref 1.1–2.2)
ALBUMIN SERPL-MCNC: 4.6 G/DL (ref 3.4–5)
ALP BLD-CCNC: 79 U/L (ref 40–129)
ALT SERPL-CCNC: 37 U/L (ref 10–40)
ANION GAP SERPL CALCULATED.3IONS-SCNC: 14 MMOL/L (ref 3–16)
AST SERPL-CCNC: 33 U/L (ref 15–37)
BILIRUB SERPL-MCNC: 0.8 MG/DL (ref 0–1)
BUN BLDV-MCNC: 21 MG/DL (ref 7–20)
CALCIUM SERPL-MCNC: 9.6 MG/DL (ref 8.3–10.6)
CHLORIDE BLD-SCNC: 108 MMOL/L (ref 99–110)
CHOLESTEROL, TOTAL: 128 MG/DL (ref 0–199)
CO2: 23 MMOL/L (ref 21–32)
CREAT SERPL-MCNC: 1.1 MG/DL (ref 0.8–1.3)
GFR AFRICAN AMERICAN: >60
GFR NON-AFRICAN AMERICAN: >60
GLUCOSE BLD-MCNC: 114 MG/DL (ref 70–99)
HBA1C MFR BLD: 5.7 %
HDLC SERPL-MCNC: 64 MG/DL (ref 40–60)
LDL CHOLESTEROL CALCULATED: 53 MG/DL
POTASSIUM SERPL-SCNC: 4.7 MMOL/L (ref 3.5–5.1)
SODIUM BLD-SCNC: 145 MMOL/L (ref 136–145)
TOTAL PROTEIN: 7.3 G/DL (ref 6.4–8.2)
TRIGL SERPL-MCNC: 53 MG/DL (ref 0–150)
VLDLC SERPL CALC-MCNC: 11 MG/DL

## 2022-05-10 PROCEDURE — 1036F TOBACCO NON-USER: CPT | Performed by: FAMILY MEDICINE

## 2022-05-10 PROCEDURE — 4040F PNEUMOC VAC/ADMIN/RCVD: CPT | Performed by: FAMILY MEDICINE

## 2022-05-10 PROCEDURE — 83036 HEMOGLOBIN GLYCOSYLATED A1C: CPT | Performed by: FAMILY MEDICINE

## 2022-05-10 PROCEDURE — G8427 DOCREV CUR MEDS BY ELIG CLIN: HCPCS | Performed by: FAMILY MEDICINE

## 2022-05-10 PROCEDURE — 99214 OFFICE O/P EST MOD 30 MIN: CPT | Performed by: FAMILY MEDICINE

## 2022-05-10 PROCEDURE — G8417 CALC BMI ABV UP PARAM F/U: HCPCS | Performed by: FAMILY MEDICINE

## 2022-05-10 PROCEDURE — 3044F HG A1C LEVEL LT 7.0%: CPT | Performed by: FAMILY MEDICINE

## 2022-05-10 PROCEDURE — 1123F ACP DISCUSS/DSCN MKR DOCD: CPT | Performed by: FAMILY MEDICINE

## 2022-05-10 RX ORDER — HYDROCORTISONE 25 MG/G
CREAM TOPICAL 2 TIMES DAILY PRN
Qty: 28 G | Refills: 1 | Status: SHIPPED | OUTPATIENT
Start: 2022-05-10

## 2022-05-10 ASSESSMENT — PATIENT HEALTH QUESTIONNAIRE - PHQ9
SUM OF ALL RESPONSES TO PHQ QUESTIONS 1-9: 0
SUM OF ALL RESPONSES TO PHQ QUESTIONS 1-9: 0
2. FEELING DOWN, DEPRESSED OR HOPELESS: 0
SUM OF ALL RESPONSES TO PHQ QUESTIONS 1-9: 0
SUM OF ALL RESPONSES TO PHQ QUESTIONS 1-9: 0
1. LITTLE INTEREST OR PLEASURE IN DOING THINGS: 0
SUM OF ALL RESPONSES TO PHQ9 QUESTIONS 1 & 2: 0

## 2022-05-10 NOTE — PROGRESS NOTES
5/10/2022    Blood pressure 118/64, pulse 63, height 5' 11\" (1.803 m), weight 203 lb (92.1 kg), SpO2 97 %. Lani Mendieta (:  1940) is a [de-identified] y.o. male, here for evaluation of the following medical concerns:    Chief Complaint   Patient presents with    Diabetes     f/u dm check     Here for routine follow up of DM-2. No complications. No meds. a1c today is 5.7    He has been having R knee pain and stiffness,  Hands also. Using topical icy hot and wears a compression brace. Bothers him at random times. Does not swell. Feels as whole knee- circumferentially. Can radiate down leg. Most the time it is 'fine' however. No swelling. No prior x rays. 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 His brother passed from colon cancer at age 80. He is requesting a home test.    He wants refill on hemorrhoid cream.  Flares randomly, about once a month. Sometimes bleeds. Uses miralax for constipation. No red flag sx like wt loss, fatigue. He sees urology dr Brittany Gil q September. Has 1-4 x nocturia despite proscar and flomax    On atorva due to DM and acortic calcifications. He is fasting for rechek. Last lipid test:  Lab Results   Component Value Date    CHOL 122 05/10/2021    TRIG 54 05/10/2021    HDL 64 (H) 05/10/2021    LDLCALC 47 05/10/2021     Lab Results   Component Value Date    ALT 31 05/10/2021    AST 29 05/10/2021     He uses hctz and norvasc 2.5 for BP. No SE's unless he stands up too quickly when weather is warm (brief orthostatic hypotension without falls)  Last renal function test:   Lab Results   Component Value Date     05/10/2021    K 4.7 05/10/2021    BUN 17 05/10/2021    CREATININE 0.9 05/10/2021     CrCl cannot be calculated (Patient's most recent lab result is older than the maximum 180 days allowed. ). 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  cause unknown.    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  Calcification of abdominal aorta (HCC)    Eczema    S/P colonoscopy with polypectomy-12/8/16; O'shira; multiple polyps. recall 3 yrs.  Type 2 diabetes mellitus with diabetic polyneuropathy, without long-term current use of insulin (HCC)    External hemorrhoids    Other malignant neuroendocrine tumors (HCC)        Body mass index is 28.31 kg/m². Wt Readings from Last 3 Encounters:   05/10/22 203 lb (92.1 kg)   11/08/21 206 lb (93.4 kg)   05/10/21 203 lb (92.1 kg)       BP Readings from Last 3 Encounters:   05/10/22 118/64   11/08/21 124/70   05/10/21 128/78       Allergies   Allergen Reactions    Pneumovax [Pneumococcal Polysaccharide Vaccine] Swelling     Arm swelling x 3 days       Prior to Visit Medications    Medication Sig Taking?  Authorizing Provider   atorvastatin (LIPITOR) 40 MG tablet TAKE ONE TABLET BY MOUTH DAILY Yes Floresita Valdovinos MD   amLODIPine (NORVASC) 2.5 MG tablet TAKE ONE TABLET BY MOUTH DAILY Yes Floresita Valdovinos MD   blood glucose test strips (FREESTYLE LITE) strip USE  TO TEST BLOOD SUGAR DAILY AS NEEDED Yes Floresita Valdovinos MD   hydroCHLOROthiazide (MICROZIDE) 12.5 MG capsule TAKE ONE CAPSULE BY MOUTH DAILY Yes Floresita Valdovinos MD   hydrocortisone (PROCTOZONE-HC) 2.5 % CREA rectal cream Place rectally 2 times daily as needed for Hemorrhoids Yes Floresita Valdovinos MD   FreeStyle Lancets Benjamen Prey Yes Floresita Valdovinos MD L-Methylfolate-B6-B12 (FOLBIC RF) 1.13-25-2 MG TABS Take 2 tablets by mouth daily Yes Rossy Ford MD   aspirin (ASPIRIN CHILDRENS) 81 MG chewable tablet Take 1 tablet by mouth daily Yes Rossy Ford MD   finasteride (PROSCAR) 5 MG tablet Take 5 mg by mouth daily Yes Historical Provider, MD   hydrocortisone (WESTCORT) 0.2 % cream Apply topically 2 times daily. Yes Rossy Ford MD   glucose monitoring kit (FREESTYLE) monitoring kit 1 kit by Does not apply route daily as needed Yes Rossy Ford MD   tamsulosin (FLOMAX) 0.4 MG capsule Take 0.4 mg by mouth daily Yes Historical Provider, MD   polyethylene glycol (GLYCOLAX) powder Take 17 g by mouth daily Yes Historical Provider, MD   Cholecalciferol (VITAMIN D) 2000 UNITS CAPS capsule Take 4,000 Units by mouth daily Yes Historical Provider, MD   Misc Natural Products (GLUCOSAMINE CHOND COMPLEX/MSM PO) Take 1,500 mg by mouth daily  Yes Historical Provider, MD        Social History     Tobacco Use    Smoking status: Never Smoker    Smokeless tobacco: Never Used   Substance Use Topics    Alcohol use: Yes     Alcohol/week: 0.0 standard drinks     Comment: socially    Drug use: No       Review of Systems    Physical Exam  Vitals and nursing note reviewed. Constitutional:       General: He is not in acute distress. Appearance: Normal appearance. He is well-developed. He is not diaphoretic. Cardiovascular:      Rate and Rhythm: Normal rate and regular rhythm. Pulses: Normal pulses. Heart sounds: Normal heart sounds. No murmur heard. Pulmonary:      Effort: Pulmonary effort is normal. No respiratory distress. Breath sounds: Normal breath sounds. No wheezing or rales. Musculoskeletal:      Cervical back: Normal range of motion. Right lower leg: Edema present. Left lower leg: Edema present. Comments: R knee: mild bony enlargement, no effusion. No JLT or bony tenderness.     Skin:     General: Skin is warm and dry. Neurological:      General: No focal deficit present. Mental Status: He is alert and oriented to person, place, and time. Mental status is at baseline. Psychiatric:         Mood and Affect: Mood normal.         Behavior: Behavior normal.         Thought Content: Thought content normal.         Judgment: Judgment normal.         ASSESSMENT/PLAN:    1. Type 2 diabetes mellitus with diabetic polyneuropathy, without long-term current use of insulin (HCC)  - POCT glycosylated hemoglobin (Hb A1C) to goal (5.7) on diet alone. Continue to monitor. 2. Calcification of abdominal aorta (HCC)  - no symptomatic CVD. continue statin and asa 81.    3. Sarcoidosis of lung ([facility] [facility]  - asymtpomatic; no longer follows with pulm. 4. Other malignant neuroendocrine tumors ([facility]  - released from monitoring by DR Severino Dom[facility]nguez at [facility]. No new symtpoms worrisome for recurrence. 5. 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 OK to use topical voltaren gel. He will report if sx worsen over time and desires further evaluation. 6. Dyslipidemia  - Stable; continue high intensity statin due to DM+athersclerosis. - Lipid Panel; Future    7. Essential hypertension  - Blood pressure is at goal on current therapy; continue meds and monitoring. Regular physical activity and healthy diet (low sodium, multiple servings of produce daily) was recommended. Renal function to be assessed yearly. - Comprehensive Metabolic Panel; Future    8. Screen for colon cancer  discussed that he is free to decline further colonoscopy due to age [de-identified] now. He has prior polyps, though and wants some screening- opts for cologuard, even though this is not the recommended setting for cologuard. He will proceed with scope if the test is pos, he thinks. - Fecal DNA Colorectal cancer screening (Cologuard)    9. S/P splenectomy  - he declines vaccine boosters for meningitis currently.       Return in about 6 months (around 11/10/2022) for follow up diabetes, follow up HTN. An  electronicsignature was used to authenticate this note.     --Gay Phillips MD on 5/10/2022 at 11:32 AM

## 2022-05-25 RX ORDER — HYDROCHLOROTHIAZIDE 12.5 MG/1
CAPSULE, GELATIN COATED ORAL
Qty: 90 CAPSULE | Refills: 1 | Status: SHIPPED | OUTPATIENT
Start: 2022-05-25

## 2022-06-27 DIAGNOSIS — I10 ESSENTIAL HYPERTENSION: ICD-10-CM

## 2022-06-27 RX ORDER — AMLODIPINE BESYLATE 2.5 MG/1
TABLET ORAL
Qty: 90 TABLET | Refills: 1 | Status: SHIPPED | OUTPATIENT
Start: 2022-06-27

## 2022-08-24 RX ORDER — ATORVASTATIN CALCIUM 40 MG/1
TABLET, FILM COATED ORAL
Qty: 90 TABLET | Refills: 1 | Status: SHIPPED | OUTPATIENT
Start: 2022-08-24

## 2022-09-29 LAB — NONINV COLON CA DNA+OCC BLD SCRN STL QL: NEGATIVE

## 2022-10-17 LAB — PROSTATE SPECIFIC ANTIGEN: 3.9 NG/ML (ref 0–4)

## 2022-11-10 ENCOUNTER — OFFICE VISIT (OUTPATIENT)
Dept: FAMILY MEDICINE CLINIC | Age: 82
End: 2022-11-10
Payer: MEDICARE

## 2022-11-10 VITALS
OXYGEN SATURATION: 98 % | DIASTOLIC BLOOD PRESSURE: 60 MMHG | BODY MASS INDEX: 31.52 KG/M2 | SYSTOLIC BLOOD PRESSURE: 130 MMHG | RESPIRATION RATE: 12 BRPM | HEART RATE: 70 BPM | WEIGHT: 226 LBS

## 2022-11-10 DIAGNOSIS — Z90.81 S/P SPLENECTOMY: ICD-10-CM

## 2022-11-10 DIAGNOSIS — I10 ESSENTIAL HYPERTENSION: ICD-10-CM

## 2022-11-10 DIAGNOSIS — E11.42 TYPE 2 DIABETES MELLITUS WITH DIABETIC POLYNEUROPATHY, WITHOUT LONG-TERM CURRENT USE OF INSULIN (HCC): Primary | ICD-10-CM

## 2022-11-10 LAB — HBA1C MFR BLD: NORMAL %

## 2022-11-10 PROCEDURE — 83036 HEMOGLOBIN GLYCOSYLATED A1C: CPT | Performed by: FAMILY MEDICINE

## 2022-11-10 PROCEDURE — G8427 DOCREV CUR MEDS BY ELIG CLIN: HCPCS | Performed by: FAMILY MEDICINE

## 2022-11-10 PROCEDURE — G8484 FLU IMMUNIZE NO ADMIN: HCPCS | Performed by: FAMILY MEDICINE

## 2022-11-10 PROCEDURE — 3074F SYST BP LT 130 MM HG: CPT | Performed by: FAMILY MEDICINE

## 2022-11-10 PROCEDURE — 99214 OFFICE O/P EST MOD 30 MIN: CPT | Performed by: FAMILY MEDICINE

## 2022-11-10 PROCEDURE — 1123F ACP DISCUSS/DSCN MKR DOCD: CPT | Performed by: FAMILY MEDICINE

## 2022-11-10 PROCEDURE — 1036F TOBACCO NON-USER: CPT | Performed by: FAMILY MEDICINE

## 2022-11-10 PROCEDURE — 3078F DIAST BP <80 MM HG: CPT | Performed by: FAMILY MEDICINE

## 2022-11-10 PROCEDURE — 3044F HG A1C LEVEL LT 7.0%: CPT | Performed by: FAMILY MEDICINE

## 2022-11-10 PROCEDURE — G8417 CALC BMI ABV UP PARAM F/U: HCPCS | Performed by: FAMILY MEDICINE

## 2022-11-10 NOTE — PROGRESS NOTES
11/10/2022    Blood pressure 130/60, pulse 70, resp. rate 12, weight 226 lb (102.5 kg), SpO2 98 %. Jabari Quinteros (:  1940) is a [de-identified] y.o. male, here for evaluation of the following medical concerns:    Chief Complaint   Patient presents with    Diabetes     Here for check up. He is concerned about a period of having very low energy for up to a week. In October. Dry cough for a few days. No HA or fever. No body aches. No n/v/d. No recent vaccination. But has returned to normal after a week. Now he is back to normal.  Out raking leaves. 'I can do whatever I want to'    His back and knee does not bother him any longer. 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 Dermatology group on [address]. Says it was fully excised. Brother  of colon cancer  last year at age 80. Dagmar Euceda  FIT-DNA was neg     DM-2:  Podiatery : dr Isabell Lockhart doctor: [facility] on [address]. DR Boyer OD. Stable. Good control historically:  Lab Results   Component Value Date/Time    LABA1C 5.7 05/10/2022 11:39 AM    LABA1C 6.0 2021 12:14 PM    LABA1C 5.7 05/10/2021 11:29 AM   A1c today 5.9. Does test sugars periodically: usually after breakfast- normally 130's normally. Nothing over 160. No med use. GFR >60  Microalbuminuria: neg    HTN: on norvasc/hctz for HTN. Leg edema gets better overnight. Does not like compression hose. No hx of CAD, TIA, CVA or PVD. Last renal function test:   Lab Results   Component Value Date/Time     05/10/2022 12:22 PM    K 4.7 05/10/2022 12:22 PM    BUN 21 05/10/2022 12:22 PM    CREATININE 1.1 05/10/2022 12:22 PM     CrCl cannot be calculated (Patient's most recent lab result is older than the maximum 180 days allowed. ). Hyperlipidemia: on atorva. No events but has incidental atherosclerosis.  (Decided to continue asa)  Last lipid test:  Lab Results   Component Value Date    CHOL 128 05/10/2022    TRIG 53 05/10/2022    HDL 64 (H) 05/10/2022 LDLCALC 53 05/10/2022     Lab Results   Component Value Date    ALT 37 05/10/2022    AST 33 05/10/2022     He no longer sees DR Jemma Andrwe at [facility] (prior pancreatic neuroendocrine tumor). No symptoms. He is due for meningitis vaccines dueto splenectomy. He prefers not to. Patient Active Problem List   Diagnosis    Sarcoidosis of lung- Dr Leung Never loss of right eye- partial.  cause unknown. 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 Calcification of abdominal aorta (HCC)    Eczema    S/P colonoscopy with polypectomy-12/8/16; O'shira; multiple polyps. recall 3 yrs. Type 2 diabetes mellitus with diabetic polyneuropathy, without long-term current use of insulin (HCC)    External hemorrhoids    Other malignant neuroendocrine tumors ([facility]        Body mass index is 31.52 kg/m². Wt Readings from Last 3 Encounters:   11/10/22 226 lb (102.5 kg)   05/10/22 203 lb (92.1 kg)   11/08/21 206 lb (93.4 kg)       BP Readings from Last 3 Encounters:   11/10/22 130/60   05/10/22 118/64   11/08/21 124/70       Allergies   Allergen Reactions    Pneumovax [Pneumococcal Polysaccharide Vaccine] Swelling     Arm swelling x 3 days       Prior to Visit Medications    Medication Sig Taking?  Authorizing Provider   atorvastatin (LIPITOR) 40 MG tablet TAKE ONE TABLET BY MOUTH DAILY Yes Lambert Willis MD   amLODIPine (NORVASC) 2.5 MG tablet TAKE ONE TABLET BY MOUTH DAILY Yes Lambert Willis MD   hydroCHLOROthiazide (MICROZIDE) 12.5 MG capsule TAKE ONE CAPSULE BY MOUTH DAILY Yes Lambert Willis MD hydrocortisone (PROCTOZONE-HC) 2.5 % CREA rectal cream Place rectally 2 times daily as needed for Hemorrhoids Yes Tim Pugh MD   blood glucose test strips (FREESTYLE LITE) strip USE  TO TEST BLOOD SUGAR DAILY AS NEEDED Yes Tim Pugh MD   FreeStyle Lancets MISC USE ONE LANCET TO TEST DAILY Yes Tim Pugh MD   D-Hkheihkhuugh-O1-B12 [facility] RF) 1.13-25-2 MG TABS Take 2 tablets by mouth daily Yes Tim Pugh MD   aspirin (ASPIRIN CHILDRENS) 81 MG chewable tablet Take 1 tablet by mouth daily Yes Tim Pugh MD   finasteride (PROSCAR) 5 MG tablet Take 5 mg by mouth daily Yes Historical Provider, MD   hydrocortisone (WESTCORT) 0.2 % cream Apply topically 2 times daily. Yes Tim Pugh MD   glucose monitoring kit (FREESTYLE) monitoring kit 1 kit by Does not apply route daily as needed Yes Tim Pugh MD   tamsulosin (FLOMAX) 0.4 MG capsule Take 0.4 mg by mouth daily Yes Historical Provider, MD   polyethylene glycol (GLYCOLAX) powder Take 17 g by mouth daily Yes Historical Provider, MD   Cholecalciferol (VITAMIN D) 2000 UNITS CAPS capsule Take 4,000 Units by mouth daily Yes Historical Provider, MD   Misc Natural Products (GLUCOSAMINE CHOND COMPLEX/MSM PO) Take 1,500 mg by mouth daily  Yes Historical Provider, MD        Social History     Tobacco Use    Smoking status: Never    Smokeless tobacco: Never   Substance Use Topics    Alcohol use: Yes     Alcohol/week: 0.0 standard drinks     Comment: socially    Drug use: No       Review of Systems No chest pains, dizziness, heart palpitations, dyspnea, lightheadedness, worsening edema. Physical Exam  Vitals and nursing note reviewed. Constitutional:       General: He is not in acute distress. Appearance: Normal appearance. He is well-developed. He is not diaphoretic. Cardiovascular:      Rate and Rhythm: Normal rate and regular rhythm. Pulses: Normal pulses.       Heart sounds: Normal heart sounds. No murmur heard. Pulmonary:      Effort: Pulmonary effort is normal. No respiratory distress. Breath sounds: Normal breath sounds. No wheezing or rales. Musculoskeletal:      Cervical back: Normal range of motion. Comments: Mild LLE edema, L>R   Skin:     General: Skin is warm and dry. Neurological:      General: No focal deficit present. Mental Status: He is alert and oriented to person, place, and time. Mental status is at baseline. Psychiatric:         Mood and Affect: Mood normal.         Behavior: Behavior normal.         Thought Content: Thought content normal.         Judgment: Judgment normal.       ASSESSMENT/PLAN:    1. Type 2 diabetes mellitus with diabetic polyneuropathy, without long-term current use of insulin (HCC)  - A1c to goal with lifestyle choices. Encouraged compliance with diet, exercise, yearly eye exams for retinopathy, daily foot care/observation.  - POCT glycosylated hemoglobin (Hb A1C)  5.9    2. Essential hypertension  - Blood pressure is at goal on current therapy; continue meds and monitoring. Regular physical activity and healthy diet (low sodium, multiple servings of produce daily) was recommended. Renal function to be assessed yearly. 3. S/P splenectomy  - discussed meningococcal vaccinations for this. He declines these at this time. Return for follow up diabetes, fasting labs. An  "[employer]ignature was used to authenticate this note.     --Renuka Harris MD on 11/10/2022 at 11:34 AM

## 2022-11-29 RX ORDER — HYDROCHLOROTHIAZIDE 12.5 MG/1
CAPSULE, GELATIN COATED ORAL
Qty: 90 CAPSULE | Refills: 1 | Status: SHIPPED | OUTPATIENT
Start: 2022-11-29

## 2022-12-26 DIAGNOSIS — I10 ESSENTIAL HYPERTENSION: ICD-10-CM

## 2022-12-27 RX ORDER — AMLODIPINE BESYLATE 2.5 MG/1
TABLET ORAL
Qty: 90 TABLET | Refills: 1 | Status: SHIPPED | OUTPATIENT
Start: 2022-12-27

## 2023-02-21 DIAGNOSIS — E11.42 TYPE 2 DIABETES MELLITUS WITH DIABETIC POLYNEUROPATHY, WITHOUT LONG-TERM CURRENT USE OF INSULIN (HCC): ICD-10-CM

## 2023-02-21 RX ORDER — BLOOD-GLUCOSE METER
KIT MISCELLANEOUS
Qty: 50 STRIP | Refills: 3 | Status: SHIPPED | OUTPATIENT
Start: 2023-02-21

## 2023-02-21 RX ORDER — ATORVASTATIN CALCIUM 40 MG/1
TABLET, FILM COATED ORAL
Qty: 90 TABLET | Refills: 1 | Status: SHIPPED | OUTPATIENT
Start: 2023-02-21

## 2023-05-11 ENCOUNTER — OFFICE VISIT (OUTPATIENT)
Dept: FAMILY MEDICINE CLINIC | Age: 83
End: 2023-05-11

## 2023-05-11 VITALS
OXYGEN SATURATION: 97 % | HEIGHT: 70 IN | BODY MASS INDEX: 31.35 KG/M2 | HEART RATE: 66 BPM | DIASTOLIC BLOOD PRESSURE: 74 MMHG | WEIGHT: 219 LBS | SYSTOLIC BLOOD PRESSURE: 126 MMHG | RESPIRATION RATE: 14 BRPM

## 2023-05-11 DIAGNOSIS — E78.5 DYSLIPIDEMIA: ICD-10-CM

## 2023-05-11 DIAGNOSIS — Z00.00 MEDICARE ANNUAL WELLNESS VISIT, SUBSEQUENT: Primary | ICD-10-CM

## 2023-05-11 DIAGNOSIS — I10 ESSENTIAL HYPERTENSION: ICD-10-CM

## 2023-05-11 DIAGNOSIS — R53.82 CHRONIC FATIGUE: ICD-10-CM

## 2023-05-11 DIAGNOSIS — R45.86 MOOD DISTURBANCE: ICD-10-CM

## 2023-05-11 DIAGNOSIS — E11.42 TYPE 2 DIABETES MELLITUS WITH DIABETIC POLYNEUROPATHY, WITHOUT LONG-TERM CURRENT USE OF INSULIN (HCC): ICD-10-CM

## 2023-05-11 DIAGNOSIS — C7A.8 OTHER MALIGNANT NEUROENDOCRINE TUMORS (HCC): ICD-10-CM

## 2023-05-11 DIAGNOSIS — R06.09 DYSPNEA ON EXERTION: ICD-10-CM

## 2023-05-11 DIAGNOSIS — E55.9 VITAMIN D DEFICIENCY: ICD-10-CM

## 2023-05-11 DIAGNOSIS — D86.0 SARCOIDOSIS OF LUNG (HCC): ICD-10-CM

## 2023-05-11 DIAGNOSIS — I70.0 CALCIFICATION OF ABDOMINAL AORTA (HCC): ICD-10-CM

## 2023-05-11 DIAGNOSIS — Z90.81 S/P SPLENECTOMY: ICD-10-CM

## 2023-05-11 LAB
25(OH)D3 SERPL-MCNC: 39.2 NG/ML
ALBUMIN SERPL-MCNC: 4.9 G/DL (ref 3.4–5)
ALBUMIN/GLOB SERPL: 1.8 {RATIO} (ref 1.1–2.2)
ALP SERPL-CCNC: 92 U/L (ref 40–129)
ALT SERPL-CCNC: 31 U/L (ref 10–40)
ANION GAP SERPL CALCULATED.3IONS-SCNC: 12 MMOL/L (ref 3–16)
AST SERPL-CCNC: 28 U/L (ref 15–37)
BASOPHILS # BLD: 0 K/UL (ref 0–0.2)
BASOPHILS NFR BLD: 0.5 %
BILIRUB SERPL-MCNC: 0.8 MG/DL (ref 0–1)
BUN SERPL-MCNC: 17 MG/DL (ref 7–20)
CALCIUM SERPL-MCNC: 9.8 MG/DL (ref 8.3–10.6)
CHLORIDE SERPL-SCNC: 106 MMOL/L (ref 99–110)
CHOLEST SERPL-MCNC: 122 MG/DL (ref 0–199)
CK SERPL-CCNC: 214 U/L (ref 39–308)
CO2 SERPL-SCNC: 25 MMOL/L (ref 21–32)
CREAT SERPL-MCNC: 1.1 MG/DL (ref 0.8–1.3)
CRP SERPL-MCNC: <3 MG/L (ref 0–5.1)
DEPRECATED RDW RBC AUTO: 15 % (ref 12.4–15.4)
EOSINOPHIL # BLD: 0.2 K/UL (ref 0–0.6)
EOSINOPHIL NFR BLD: 3.4 %
ERYTHROCYTE [SEDIMENTATION RATE] IN BLOOD BY WESTERGREN METHOD: 6 MM/HR (ref 0–20)
GFR SERPLBLD CREATININE-BSD FMLA CKD-EPI: >60 ML/MIN/{1.73_M2}
GLUCOSE SERPL-MCNC: 104 MG/DL (ref 70–99)
HCT VFR BLD AUTO: 40.6 % (ref 40.5–52.5)
HDLC SERPL-MCNC: 53 MG/DL (ref 40–60)
HGB BLD-MCNC: 13.6 G/DL (ref 13.5–17.5)
LDLC SERPL CALC-MCNC: 56 MG/DL
LYMPHOCYTES # BLD: 2.3 K/UL (ref 1–5.1)
LYMPHOCYTES NFR BLD: 34.9 %
MCH RBC QN AUTO: 32.1 PG (ref 26–34)
MCHC RBC AUTO-ENTMCNC: 33.6 G/DL (ref 31–36)
MCV RBC AUTO: 95.4 FL (ref 80–100)
MONOCYTES # BLD: 0.8 K/UL (ref 0–1.3)
MONOCYTES NFR BLD: 12.5 %
NEUTROPHILS # BLD: 3.3 K/UL (ref 1.7–7.7)
NEUTROPHILS NFR BLD: 48.7 %
PLATELET # BLD AUTO: 196 K/UL (ref 135–450)
PMV BLD AUTO: 9.7 FL (ref 5–10.5)
POTASSIUM SERPL-SCNC: 4.7 MMOL/L (ref 3.5–5.1)
PROT SERPL-MCNC: 7.6 G/DL (ref 6.4–8.2)
RBC # BLD AUTO: 4.26 M/UL (ref 4.2–5.9)
SODIUM SERPL-SCNC: 143 MMOL/L (ref 136–145)
TRIGL SERPL-MCNC: 65 MG/DL (ref 0–150)
TSH SERPL DL<=0.005 MIU/L-ACNC: 3.81 UIU/ML (ref 0.27–4.2)
VLDLC SERPL CALC-MCNC: 13 MG/DL
WBC # BLD AUTO: 6.7 K/UL (ref 4–11)

## 2023-05-11 SDOH — ECONOMIC STABILITY: FOOD INSECURITY: WITHIN THE PAST 12 MONTHS, YOU WORRIED THAT YOUR FOOD WOULD RUN OUT BEFORE YOU GOT MONEY TO BUY MORE.: NEVER TRUE

## 2023-05-11 SDOH — ECONOMIC STABILITY: FOOD INSECURITY: WITHIN THE PAST 12 MONTHS, THE FOOD YOU BOUGHT JUST DIDN'T LAST AND YOU DIDN'T HAVE MONEY TO GET MORE.: NEVER TRUE

## 2023-05-11 SDOH — ECONOMIC STABILITY: INCOME INSECURITY: HOW HARD IS IT FOR YOU TO PAY FOR THE VERY BASICS LIKE FOOD, HOUSING, MEDICAL CARE, AND HEATING?: NOT VERY HARD

## 2023-05-11 SDOH — ECONOMIC STABILITY: HOUSING INSECURITY
IN THE LAST 12 MONTHS, WAS THERE A TIME WHEN YOU DID NOT HAVE A STEADY PLACE TO SLEEP OR SLEPT IN A SHELTER (INCLUDING NOW)?: NO

## 2023-05-11 ASSESSMENT — PATIENT HEALTH QUESTIONNAIRE - PHQ9
7. TROUBLE CONCENTRATING ON THINGS, SUCH AS READING THE NEWSPAPER OR WATCHING TELEVISION: 0
SUM OF ALL RESPONSES TO PHQ QUESTIONS 1-9: 6
2. FEELING DOWN, DEPRESSED OR HOPELESS: 1
10. IF YOU CHECKED OFF ANY PROBLEMS, HOW DIFFICULT HAVE THESE PROBLEMS MADE IT FOR YOU TO DO YOUR WORK, TAKE CARE OF THINGS AT HOME, OR GET ALONG WITH OTHER PEOPLE: 0
5. POOR APPETITE OR OVEREATING: 0
4. FEELING TIRED OR HAVING LITTLE ENERGY: 3
8. MOVING OR SPEAKING SO SLOWLY THAT OTHER PEOPLE COULD HAVE NOTICED. OR THE OPPOSITE, BEING SO FIGETY OR RESTLESS THAT YOU HAVE BEEN MOVING AROUND A LOT MORE THAN USUAL: 0
SUM OF ALL RESPONSES TO PHQ QUESTIONS 1-9: 6
SUM OF ALL RESPONSES TO PHQ QUESTIONS 1-9: 6
3. TROUBLE FALLING OR STAYING ASLEEP: 0
6. FEELING BAD ABOUT YOURSELF - OR THAT YOU ARE A FAILURE OR HAVE LET YOURSELF OR YOUR FAMILY DOWN: 0
1. LITTLE INTEREST OR PLEASURE IN DOING THINGS: 2
SUM OF ALL RESPONSES TO PHQ9 QUESTIONS 1 & 2: 3
9. THOUGHTS THAT YOU WOULD BE BETTER OFF DEAD, OR OF HURTING YOURSELF: 0
SUM OF ALL RESPONSES TO PHQ QUESTIONS 1-9: 6

## 2023-05-11 ASSESSMENT — LIFESTYLE VARIABLES
HOW OFTEN DO YOU HAVE A DRINK CONTAINING ALCOHOL: MONTHLY OR LESS
HOW MANY STANDARD DRINKS CONTAINING ALCOHOL DO YOU HAVE ON A TYPICAL DAY: 1 OR 2

## 2023-05-11 NOTE — PATIENT INSTRUCTIONS
thought of yourself as being active. It's frustrating when you can't do the things you want. Being more active can help. What's holding you back? Getting started. Have a goal, but break it into easy tasks. Small steps build into big accomplishments. Staying motivated. If you feel like skipping your activity, remember your goal. Maybe you want to move better and stay independent. Every activity gets you one step closer. Not feeling your best.  Start with 5 minutes of an activity you enjoy. Prove to yourself you can do it. As you get comfortable, increase your time. You may not be where you want to be. But you're in the process of getting there. Everyone starts somewhere. How can you find safe ways to stay active? Talk with your doctor about any physical challenges you're facing. Make a plan with your doctor if you have a health problem or aren't sure how to get started with activity. If you're already active, ask your doctor if there is anything you should change to stay safe as your body and health change. If you tend to feel dizzy after you take medicine, avoid activity at that time. Try being active before you take your medicine. This will reduce your risk of falls. If you plan to be active at home, make sure to clear your space before you get started. Remove things like TV cords, coffee tables, and throw rugs. It's safest to have plenty of space to move freely. The key to getting more active is to take it slow and steady. Try to improve only a little bit at a time. Pick just one area to improve on at first. And if an activity hurts, stop and talk to your doctor. Where can you learn more? Go to http://www.mcclelland.com/ and enter P600 to learn more about \"Learning About Being Active as an Older Adult. \"  Current as of: October 10, 2022               Content Version: 13.6  © 6752-8367 Healthwise, Incorporated. Care instructions adapted under license by [facility] ([facility]).  If you have questions

## 2023-05-11 NOTE — PROGRESS NOTES
Medicare Annual Wellness Visit    Name: Meghna Dodd Date: 2023   MRN: [id number] Sex: Male   Age: 81 y.o. Ethnicity: Unavailable / Unknown   : 1940 Race: White (non-)      Elyssa Hernandez is here for Medicare AWV    Feels gradually less energetic (low energy to do things, despite normal motivation). Harder to cut grass- has to take breaks after 5-10 min. Feels weaker, a bit SOB. No chest pain. No myalgias. Can feel dizzy/LH (ngoc in heat if bends over and stands up). Bp at home is usually normal.  But test infrequently. Nohx CAD. Hx sarcoidosis- DR [facility]  has not see her recently. No cough or chest pain. Leg edema left leg is about the same. Since bad ankle fracture about . Takes atorva 40 for a long time. Last lipid test:  Lab Results   Component Value Date    CHOL 128 05/10/2022    TRIG 53 05/10/2022    HDL 64 (H) 05/10/2022    LDLCALC 53 05/10/2022     Lab Results   Component Value Date    ALT 37 05/10/2022    AST 33 05/10/2022     Norvasc 2.5, hctz 12.5 for bp. Last renal function test:   Lab Results   Component Value Date/Time     05/10/2022 12:22 PM    K 4.7 05/10/2022 12:22 PM    BUN 21 05/10/2022 12:22 PM    CREATININE 1.1 05/10/2022 12:22 PM     CrCl cannot be calculated (Patient's most recent lab result is older than the maximum 180 days allowed. ). No longer followed by  doctor for incidental pancreatic Neuroendocrine tumor. tests sugars at home once a week: always normal in the morning. Or 2 hours post meals (<130/<180). No meds. Lab Results   Component Value Date/Time    LABA1C 5.7 05/10/2022 11:39 AM    LABA1C 6.0 2021 12:14 PM    LABA1C 5.7 05/10/2021 11:29 AM      Dr Rafa Castillo follows PSA. Last was 10/17/22: 3.9 (stable)    Screenings for behavioral, psychosocial and functional/safety risks, and cognitive dysfunction are all negative except as indicated below.  These results, as well as other patient data from the [address] [facility]

## 2023-05-12 DIAGNOSIS — R06.09 DYSPNEA ON EXERTION: Primary | ICD-10-CM

## 2023-05-12 LAB
CREAT UR-MCNC: 233.7 MG/DL (ref 39–259)
EST. AVERAGE GLUCOSE BLD GHB EST-MCNC: 116.9 MG/DL
HBA1C MFR BLD: 5.7 %
MICROALBUMIN UR DL<=1MG/L-MCNC: 1.3 MG/DL
MICROALBUMIN/CREAT UR: 5.6 MG/G (ref 0–30)

## 2023-05-17 ENCOUNTER — TELEPHONE (OUTPATIENT)
Dept: FAMILY MEDICINE CLINIC | Age: 83
End: 2023-05-17

## 2023-05-17 NOTE — TELEPHONE ENCOUNTER
Starla Garcia from [address] [place] [address] called wanting to clarify the order Dr. Esther Cummings put in for the NM Cardiac Stress Test Nuclear Imaging    She said she sees two different orders for this and wanted schedule the right one    Starla Garcia can be reached at [phone] Option #1 & then Option #1 again     Please Advise

## 2023-05-17 NOTE — TELEPHONE ENCOUNTER
I need  nuc med treadmill stress test.    The stress test orders are cryptic to me as I do not order them regularly and it's not obvious how to order a treadmill nuc med stress test.     Thanks.

## 2023-05-22 RX ORDER — HYDROCHLOROTHIAZIDE 12.5 MG/1
CAPSULE, GELATIN COATED ORAL
Qty: 90 CAPSULE | Refills: 1 | Status: SHIPPED | OUTPATIENT
Start: 2023-05-22

## 2023-05-24 ENCOUNTER — TELEPHONE (OUTPATIENT)
Dept: FAMILY MEDICINE CLINIC | Age: 83
End: 2023-05-24

## 2023-05-24 DIAGNOSIS — R06.09 DYSPNEA ON EXERTION: Primary | ICD-10-CM

## 2023-05-26 ENCOUNTER — HOSPITAL ENCOUNTER (OUTPATIENT)
Dept: NON INVASIVE DIAGNOSTICS | Age: 83
Discharge: HOME | End: 2023-05-26
Payer: MEDICARE

## 2023-05-26 DIAGNOSIS — R06.09 DYSPNEA ON EXERTION: ICD-10-CM

## 2023-05-26 LAB
LV EF: 63 %
LVEF MODALITY: NORMAL

## 2023-05-26 PROCEDURE — A9502 TC99M TETROFOSMIN: HCPCS | Performed by: FAMILY MEDICINE

## 2023-05-26 PROCEDURE — 78452 HT MUSCLE IMAGE SPECT MULT: CPT | Performed by: INTERNAL MEDICINE

## 2023-05-26 PROCEDURE — 3430000000 HC RX DIAGNOSTIC RADIOPHARMACEUTICAL: Performed by: FAMILY MEDICINE

## 2023-05-26 PROCEDURE — 93017 CV STRESS TEST TRACING ONLY: CPT | Performed by: INTERNAL MEDICINE

## 2023-05-26 RX ADMIN — TETROFOSMIN 10 MILLICURIE: 1.38 INJECTION, POWDER, LYOPHILIZED, FOR SOLUTION INTRAVENOUS at 08:10

## 2023-05-26 RX ADMIN — TETROFOSMIN 30 MILLICURIE: 1.38 INJECTION, POWDER, LYOPHILIZED, FOR SOLUTION INTRAVENOUS at 09:36

## 2023-05-26 NOTE — PROGRESS NOTES
Patient instructed on Rick Protocol Stress Test Procedure including possible side effects and adverse reactions. Verbalizes knowledge and understanding and denies having any questions.

## 2023-06-20 ENCOUNTER — OFFICE VISIT (OUTPATIENT)
Dept: FAMILY MEDICINE CLINIC | Age: 83
End: 2023-06-20

## 2023-06-20 VITALS
HEART RATE: 58 BPM | TEMPERATURE: 98.5 F | HEIGHT: 70 IN | DIASTOLIC BLOOD PRESSURE: 62 MMHG | OXYGEN SATURATION: 97 % | SYSTOLIC BLOOD PRESSURE: 120 MMHG | RESPIRATION RATE: 16 BRPM | BODY MASS INDEX: 31.75 KG/M2 | WEIGHT: 221.8 LBS

## 2023-06-20 DIAGNOSIS — J30.9 ALLERGIC RHINITIS, UNSPECIFIED SEASONALITY, UNSPECIFIED TRIGGER: ICD-10-CM

## 2023-06-20 DIAGNOSIS — R53.82 CHRONIC FATIGUE: Primary | ICD-10-CM

## 2023-06-20 NOTE — PATIENT INSTRUCTIONS
For fatigue on exertion, I want you to stop atorvastatin for 2 wks and note if any changes to your energy level. If there is no difference, restart it. If there IS a difference, call me. Stop zyrtec.   You may use Allegra if you want an oral medicine, or Zaditor eye drops

## 2023-06-20 NOTE — PROGRESS NOTES
2023    Blood pressure 120/62, pulse 58, temperature 98.5 °F (36.9 °C), temperature source Oral, resp. rate 16, height 5' 10\" (1.778 m), weight 221 lb 12.8 oz (100.6 kg), SpO2 97 %. Frank Galvan (:  1940) is a 81 y.o. male, here for evaluation of the following medical concerns:    Chief Complaint   Patient presents with    Follow-up     F/u from stress test.  Still fatigues easy. Has cut down on salt. Hx HAMILTON and fatigue. Gradual onset over last year. Hx pulm sarcoidosis that has not been active in years past.  Some orthostatic symptoms if bends over or with changes in posture. Mild leg edema noted. Labs all normal, including thyroid, cbc, cmp, ck. Cardiac stress test and ekg were unremarkable. He did fatigue during the stress test.  Hx of DM and HTN, both well controlled. In the 3 wks since his stress test, he notes no change in his symptoms. not better or worse. He is able to cut grass for 45 min (his mower is not self propelled) on father's day. He did not feel sob or chest pain. But felt his legs fatigue and took a break after 45 min. He does not have CAD. He gets of a couple times at night to urinate. Can fall asleep in the afternoon watching TV. He thinks Zyrtec makes him tired, but it helps ocular itching, RN. Patient Active Problem List   Diagnosis    Sarcoidosis of lung- Dr Pankaj Recinos loss of right eye- partial.  cause unknown. 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     Calcification of abdominal aorta (HCC)    Eczema    S/P colonoscopy

## 2023-06-22 DIAGNOSIS — I10 ESSENTIAL HYPERTENSION: ICD-10-CM

## 2023-06-22 RX ORDER — AMLODIPINE BESYLATE 2.5 MG/1
TABLET ORAL
Qty: 90 TABLET | Refills: 1 | Status: SHIPPED | OUTPATIENT
Start: 2023-06-22

## 2023-07-06 ENCOUNTER — TELEPHONE (OUTPATIENT)
Dept: FAMILY MEDICINE CLINIC | Age: 83
End: 2023-07-06

## 2023-07-06 NOTE — TELEPHONE ENCOUNTER
Please advised sixto to remain off of atorvastatin for the next couple months and then follow up with me in September to discuss the possibility of starting a different cholesterol lowering medicine. Thanks.

## 2023-07-06 NOTE — TELEPHONE ENCOUNTER
Pt calling in, he stopped taking atorvastatin per Dr. Maria Dolores Espinal instructions for two weeks. Stated that he energy level is not much better but he is feeling better than how he did when he saw Dr. Magaly Frausto on 6.20.2023.     Pt can be reached at [phone]

## 2023-08-21 RX ORDER — ATORVASTATIN CALCIUM 40 MG/1
TABLET, FILM COATED ORAL
Qty: 90 TABLET | Refills: 1 | Status: SHIPPED | OUTPATIENT
Start: 2023-08-21

## 2023-08-24 RX ORDER — HYDROCORTISONE 25 MG/G
CREAM TOPICAL
Qty: 30 G | Refills: 1 | Status: SHIPPED | OUTPATIENT
Start: 2023-08-24

## 2023-09-07 ENCOUNTER — OFFICE VISIT (OUTPATIENT)
Dept: FAMILY MEDICINE CLINIC | Age: 83
End: 2023-09-07

## 2023-09-07 VITALS
TEMPERATURE: 98 F | WEIGHT: 218.4 LBS | OXYGEN SATURATION: 97 % | SYSTOLIC BLOOD PRESSURE: 122 MMHG | HEART RATE: 61 BPM | RESPIRATION RATE: 18 BRPM | HEIGHT: 70 IN | BODY MASS INDEX: 31.26 KG/M2 | DIASTOLIC BLOOD PRESSURE: 80 MMHG

## 2023-09-07 DIAGNOSIS — E78.5 DYSLIPIDEMIA: ICD-10-CM

## 2023-09-07 DIAGNOSIS — I70.0 CALCIFICATION OF ABDOMINAL AORTA (HCC): ICD-10-CM

## 2023-09-07 DIAGNOSIS — E11.42 TYPE 2 DIABETES MELLITUS WITH DIABETIC POLYNEUROPATHY, WITHOUT LONG-TERM CURRENT USE OF INSULIN (HCC): Primary | ICD-10-CM

## 2023-09-07 DIAGNOSIS — R53.82 CHRONIC FATIGUE: ICD-10-CM

## 2023-09-07 LAB — HBA1C MFR BLD: 6 %

## 2023-11-21 RX ORDER — HYDROCHLOROTHIAZIDE 12.5 MG/1
12.5 CAPSULE, GELATIN COATED ORAL DAILY
Qty: 90 CAPSULE | Refills: 1 | Status: SHIPPED | OUTPATIENT
Start: 2023-11-21

## 2023-12-29 NOTE — TELEPHONE ENCOUNTER
I don't see that you called him , or anyone else either. I do see a lab order put in yesterday. Would it be about that, and you called to tell him? Do you want me to call him? Or tell him you will call later today. Ana Rutledge
Pt returning Dr. Matt Zavala call   States he received a call yesterday and voicemail regarding a spot on his tongue   A good time to call back would be before 11   Otherwise pt may not be home but he will be on the look out for the call   Please advise
Talked to sixto. He says the are aon his toungue has been there for many years, now that he thinks about it. I advised to do the CBC/diff anyway to assess for WBC abnormalities.
abdominal pain

## 2024-03-07 ENCOUNTER — OFFICE VISIT (OUTPATIENT)
Dept: FAMILY MEDICINE CLINIC | Age: 84
End: 2024-03-07

## 2024-03-07 VITALS
DIASTOLIC BLOOD PRESSURE: 70 MMHG | RESPIRATION RATE: 18 BRPM | SYSTOLIC BLOOD PRESSURE: 120 MMHG | BODY MASS INDEX: 31.08 KG/M2 | TEMPERATURE: 98 F | OXYGEN SATURATION: 97 % | HEART RATE: 78 BPM | WEIGHT: 216.6 LBS

## 2024-03-07 DIAGNOSIS — Z90.81 S/P SPLENECTOMY: ICD-10-CM

## 2024-03-07 DIAGNOSIS — E11.42 TYPE 2 DIABETES MELLITUS WITH DIABETIC POLYNEUROPATHY, WITHOUT LONG-TERM CURRENT USE OF INSULIN (HCC): ICD-10-CM

## 2024-03-07 DIAGNOSIS — I70.0 CALCIFICATION OF ABDOMINAL AORTA (HCC): ICD-10-CM

## 2024-03-07 DIAGNOSIS — E78.5 DYSLIPIDEMIA: ICD-10-CM

## 2024-03-07 DIAGNOSIS — I10 ESSENTIAL HYPERTENSION: ICD-10-CM

## 2024-03-07 DIAGNOSIS — D86.0 SARCOIDOSIS OF LUNG (HCC): ICD-10-CM

## 2024-03-07 DIAGNOSIS — E11.42 TYPE 2 DIABETES MELLITUS WITH DIABETIC POLYNEUROPATHY, WITHOUT LONG-TERM CURRENT USE OF INSULIN (HCC): Primary | ICD-10-CM

## 2024-03-07 DIAGNOSIS — R53.82 CHRONIC FATIGUE: ICD-10-CM

## 2024-03-07 DIAGNOSIS — K14.8 TONGUE LESION: ICD-10-CM

## 2024-03-07 PROBLEM — C7A.8 OTHER MALIGNANT NEUROENDOCRINE TUMORS (HCC): Status: RESOLVED | Noted: 2020-11-11 | Resolved: 2024-03-07

## 2024-03-07 LAB
ALBUMIN SERPL-MCNC: 4.8 G/DL (ref 3.4–5)
ALBUMIN/GLOB SERPL: 1.6 {RATIO} (ref 1.1–2.2)
ALP SERPL-CCNC: 108 U/L (ref 40–129)
ALT SERPL-CCNC: 24 U/L (ref 10–40)
ANION GAP SERPL CALCULATED.3IONS-SCNC: 14 MMOL/L (ref 3–16)
AST SERPL-CCNC: 23 U/L (ref 15–37)
BILIRUB SERPL-MCNC: 0.8 MG/DL (ref 0–1)
BUN SERPL-MCNC: 18 MG/DL (ref 7–20)
CALCIUM SERPL-MCNC: 10 MG/DL (ref 8.3–10.6)
CHLORIDE SERPL-SCNC: 102 MMOL/L (ref 99–110)
CHOLEST SERPL-MCNC: 130 MG/DL (ref 0–199)
CO2 SERPL-SCNC: 23 MMOL/L (ref 21–32)
CREAT SERPL-MCNC: 1.2 MG/DL (ref 0.8–1.3)
CREAT UR-MCNC: 294.4 MG/DL (ref 39–259)
GFR SERPLBLD CREATININE-BSD FMLA CKD-EPI: >60 ML/MIN/{1.73_M2}
GLUCOSE SERPL-MCNC: 105 MG/DL (ref 70–99)
HBA1C MFR BLD: 6.4 %
HDLC SERPL-MCNC: 51 MG/DL (ref 40–60)
LDLC SERPL CALC-MCNC: 62 MG/DL
MICROALBUMIN UR DL<=1MG/L-MCNC: 4.3 MG/DL
MICROALBUMIN/CREAT UR: 14.6 MG/G (ref 0–30)
POTASSIUM SERPL-SCNC: 4.2 MMOL/L (ref 3.5–5.1)
PROT SERPL-MCNC: 7.8 G/DL (ref 6.4–8.2)
SODIUM SERPL-SCNC: 139 MMOL/L (ref 136–145)
TRIGL SERPL-MCNC: 85 MG/DL (ref 0–150)
VLDLC SERPL CALC-MCNC: 17 MG/DL

## 2024-03-07 ASSESSMENT — PATIENT HEALTH QUESTIONNAIRE - PHQ9
SUM OF ALL RESPONSES TO PHQ QUESTIONS 1-9: 1
2. FEELING DOWN, DEPRESSED OR HOPELESS: 1
SUM OF ALL RESPONSES TO PHQ QUESTIONS 1-9: 1
SUM OF ALL RESPONSES TO PHQ9 QUESTIONS 1 & 2: 1
SUM OF ALL RESPONSES TO PHQ QUESTIONS 1-9: 1
1. LITTLE INTEREST OR PLEASURE IN DOING THINGS: 0
SUM OF ALL RESPONSES TO PHQ QUESTIONS 1-9: 1

## 2024-03-07 NOTE — PROGRESS NOTES
3/7/2024    Blood pressure 120/70, pulse 78, temperature 98 °F (36.7 °C), temperature source Oral, resp. rate 18, weight 98.2 kg (216 lb 9.6 oz), SpO2 97 %.    Joseph Graff (:  1940) is a 83 y.o. male, here for evaluation of the following medical concerns:    Chief Complaint   Patient presents with    Follow-up    Diabetes    Hypertension     Doing good.   Lower back spasm  L ankle, calf still swelling     Had flu recently- cough and fatigue for 2-3 wks.  Did not get viral testing. This was after benedict, into early .  OK now.    He notes LLE swelling without pain chronically. Not worse than usual. Goes down at night. Did not like wearing compression hose- did not help.  Dr nazario recommended that, after an ankle fracture needing surgery.  This was not the start of the swelling (which was in  when he had sarcoidosis), but worsened after the fracture.    No longer sees pulm for sarcoidosis.  No cough or sob. He is just chronically tired.  Chronic fatigue was eventually worked up by cardiac stress testing, which was normal.  Last renal function test:   Lab Results   Component Value Date/Time     2023 12:17 PM    K 4.7 2023 12:17 PM     2023 12:17 PM    CO2 25 2023 12:17 PM    BUN 17 2023 12:17 PM    CREATININE 1.1 2023 12:17 PM    GLUCOSE 104 2023 12:17 PM    CALCIUM 9.8 2023 12:17 PM    LABGLOM >60 2023 12:17 PM        Last lipid test:  Lab Results   Component Value Date    CHOL 122 2023    TRIG 65 2023    HDL 53 2023    LDLCALC 56 2023     Lab Results   Component Value Date    ALT 31 2023    AST 28 2023       Lab Results   Component Value Date    TSH 3.81 2023      Lab Results   Component Value Date    WBC 6.7 2023    HGB 13.6 2023    HCT 40.6 2023    MCV 95.4 2023     2023     Takes vit D supplement.  Level was 39 .    He is able to be up and

## 2024-05-06 RX ORDER — ATORVASTATIN CALCIUM 40 MG/1
40 TABLET, FILM COATED ORAL DAILY
Qty: 90 TABLET | Refills: 1 | Status: SHIPPED | OUTPATIENT
Start: 2024-05-06

## 2024-05-08 ENCOUNTER — OFFICE VISIT (OUTPATIENT)
Dept: ENT CLINIC | Age: 84
End: 2024-05-08
Payer: MEDICARE

## 2024-05-08 VITALS
BODY MASS INDEX: 39.22 KG/M2 | SYSTOLIC BLOOD PRESSURE: 138 MMHG | DIASTOLIC BLOOD PRESSURE: 74 MMHG | TEMPERATURE: 98.1 F | HEIGHT: 70 IN | WEIGHT: 274 LBS | HEART RATE: 78 BPM

## 2024-05-08 DIAGNOSIS — K13.21 LEUKOPLAKIA, TONGUE: Primary | ICD-10-CM

## 2024-05-08 PROCEDURE — 99213 OFFICE O/P EST LOW 20 MIN: CPT | Performed by: OTOLARYNGOLOGY

## 2024-05-08 PROCEDURE — 1123F ACP DISCUSS/DSCN MKR DOCD: CPT | Performed by: OTOLARYNGOLOGY

## 2024-05-08 PROCEDURE — 3078F DIAST BP <80 MM HG: CPT | Performed by: OTOLARYNGOLOGY

## 2024-05-08 PROCEDURE — G8417 CALC BMI ABV UP PARAM F/U: HCPCS | Performed by: OTOLARYNGOLOGY

## 2024-05-08 PROCEDURE — 1036F TOBACCO NON-USER: CPT | Performed by: OTOLARYNGOLOGY

## 2024-05-08 PROCEDURE — G8427 DOCREV CUR MEDS BY ELIG CLIN: HCPCS | Performed by: OTOLARYNGOLOGY

## 2024-05-08 PROCEDURE — 3075F SYST BP GE 130 - 139MM HG: CPT | Performed by: OTOLARYNGOLOGY

## 2024-05-08 NOTE — PROGRESS NOTES
FOLLOW UP VISIT:    CHIEF COMPLAINT: Tongue lesion    INTERIM HISTORY: Lesion of the tongue on the left side.  Oral tongue inferior surface near the floor of the mouth.  First noticed by his dentist 10 years ago.  Has been stable for 10 years.  It is not growing.  Painless.  Patient is never smoked.      PAST MEDICAL HISTORY:   Social History     Tobacco Use   Smoking Status Never   Smokeless Tobacco Never                                                    Social History     Substance and Sexual Activity   Alcohol Use Yes    Alcohol/week: 0.0 standard drinks of alcohol    Comment: socially                                                    Current Outpatient Medications:     atorvastatin (LIPITOR) 40 MG tablet, TAKE 1 TABLET BY MOUTH DAILY, Disp: 90 tablet, Rfl: 1    amLODIPine (NORVASC) 2.5 MG tablet, TAKE 1 TABLET BY MOUTH DAILY, Disp: 90 tablet, Rfl: 1    hydroCHLOROthiazide (MICROZIDE) 12.5 MG capsule, TAKE 1 CAPSULE BY MOUTH DAILY, Disp: 90 capsule, Rfl: 1    hydrocortisone (ANUSOL-HC) 2.5 % CREA rectal cream, INSERT RECTALLY TWO TIMES A DAY AS NEEDED HEMORRHOIDS, Disp: 30 g, Rfl: 1    vitamin B-12 (CYANOCOBALAMIN) 1000 MCG tablet, Take 1 tablet by mouth daily, Disp: , Rfl:     FREESTYLE LITE strip, USE TO TEST BLOOD SUGAR DAILY AS NEEDED, Disp: 50 strip, Rfl: 3    FreeStyle Lancets MISC, USE ONE LANCET TO TEST DAILY, Disp: 100 each, Rfl: 1    L-Methylfolate-B6-B12 (FOLBIC RF) 1.13-25-2 MG TABS, Take 2 tablets by mouth daily, Disp: 60 tablet, Rfl: 11    finasteride (PROSCAR) 5 MG tablet, Take 1 tablet by mouth daily, Disp: , Rfl:     glucose monitoring kit (FREESTYLE) monitoring kit, 1 kit by Does not apply route daily as needed, Disp: 1 kit, Rfl: 0    tamsulosin (FLOMAX) 0.4 MG capsule, Take 1 capsule by mouth daily, Disp: , Rfl:     polyethylene glycol (GLYCOLAX) powder, Take 17 g by mouth daily, Disp: , Rfl:     Cholecalciferol (VITAMIN D) 2000 UNITS CAPS capsule, Take 4,000 Units by mouth daily, Disp: ,

## 2024-05-21 RX ORDER — HYDROCHLOROTHIAZIDE 12.5 MG/1
12.5 CAPSULE, GELATIN COATED ORAL DAILY
Qty: 90 CAPSULE | Refills: 1 | Status: SHIPPED | OUTPATIENT
Start: 2024-05-21

## 2024-06-24 DIAGNOSIS — I10 ESSENTIAL HYPERTENSION: ICD-10-CM

## 2024-06-24 RX ORDER — AMLODIPINE BESYLATE 2.5 MG/1
2.5 TABLET ORAL DAILY
Qty: 90 TABLET | Refills: 1 | Status: SHIPPED | OUTPATIENT
Start: 2024-06-24

## 2024-09-09 ENCOUNTER — OFFICE VISIT (OUTPATIENT)
Dept: FAMILY MEDICINE CLINIC | Age: 84
End: 2024-09-09

## 2024-09-09 VITALS
DIASTOLIC BLOOD PRESSURE: 78 MMHG | HEIGHT: 70 IN | OXYGEN SATURATION: 97 % | RESPIRATION RATE: 18 BRPM | WEIGHT: 228.2 LBS | HEART RATE: 75 BPM | BODY MASS INDEX: 32.67 KG/M2 | SYSTOLIC BLOOD PRESSURE: 126 MMHG

## 2024-09-09 DIAGNOSIS — D86.0 SARCOIDOSIS OF LUNG (HCC): ICD-10-CM

## 2024-09-09 DIAGNOSIS — I70.0 CALCIFICATION OF ABDOMINAL AORTA (HCC): ICD-10-CM

## 2024-09-09 DIAGNOSIS — R53.82 CHRONIC FATIGUE: ICD-10-CM

## 2024-09-09 DIAGNOSIS — Z23 NEEDS FLU SHOT: ICD-10-CM

## 2024-09-09 DIAGNOSIS — F32.0 CURRENT MILD EPISODE OF MAJOR DEPRESSIVE DISORDER WITHOUT PRIOR EPISODE (HCC): ICD-10-CM

## 2024-09-09 DIAGNOSIS — I10 ESSENTIAL HYPERTENSION: ICD-10-CM

## 2024-09-09 DIAGNOSIS — Z90.81 S/P SPLENECTOMY: ICD-10-CM

## 2024-09-09 DIAGNOSIS — E11.42 TYPE 2 DIABETES MELLITUS WITH DIABETIC POLYNEUROPATHY, WITHOUT LONG-TERM CURRENT USE OF INSULIN (HCC): ICD-10-CM

## 2024-09-09 DIAGNOSIS — Z00.00 MEDICARE ANNUAL WELLNESS VISIT, SUBSEQUENT: Primary | ICD-10-CM

## 2024-09-09 RX ORDER — LANCETS 28 GAUGE
EACH MISCELLANEOUS
Qty: 100 EACH | Refills: 3 | Status: SHIPPED | OUTPATIENT
Start: 2024-09-09

## 2024-09-09 SDOH — ECONOMIC STABILITY: INCOME INSECURITY: HOW HARD IS IT FOR YOU TO PAY FOR THE VERY BASICS LIKE FOOD, HOUSING, MEDICAL CARE, AND HEATING?: NOT HARD AT ALL

## 2024-09-09 SDOH — ECONOMIC STABILITY: FOOD INSECURITY: WITHIN THE PAST 12 MONTHS, THE FOOD YOU BOUGHT JUST DIDN'T LAST AND YOU DIDN'T HAVE MONEY TO GET MORE.: NEVER TRUE

## 2024-09-09 SDOH — ECONOMIC STABILITY: FOOD INSECURITY: WITHIN THE PAST 12 MONTHS, YOU WORRIED THAT YOUR FOOD WOULD RUN OUT BEFORE YOU GOT MONEY TO BUY MORE.: NEVER TRUE

## 2024-09-09 ASSESSMENT — PATIENT HEALTH QUESTIONNAIRE - PHQ9
7. TROUBLE CONCENTRATING ON THINGS, SUCH AS READING THE NEWSPAPER OR WATCHING TELEVISION: NOT AT ALL
9. THOUGHTS THAT YOU WOULD BE BETTER OFF DEAD, OR OF HURTING YOURSELF: NOT AT ALL
SUM OF ALL RESPONSES TO PHQ QUESTIONS 1-9: 8
6. FEELING BAD ABOUT YOURSELF - OR THAT YOU ARE A FAILURE OR HAVE LET YOURSELF OR YOUR FAMILY DOWN: NOT AT ALL
1. LITTLE INTEREST OR PLEASURE IN DOING THINGS: MORE THAN HALF THE DAYS
3. TROUBLE FALLING OR STAYING ASLEEP: NOT AT ALL
5. POOR APPETITE OR OVEREATING: SEVERAL DAYS
SUM OF ALL RESPONSES TO PHQ9 QUESTIONS 1 & 2: 4
2. FEELING DOWN, DEPRESSED OR HOPELESS: MORE THAN HALF THE DAYS
SUM OF ALL RESPONSES TO PHQ QUESTIONS 1-9: 8
8. MOVING OR SPEAKING SO SLOWLY THAT OTHER PEOPLE COULD HAVE NOTICED. OR THE OPPOSITE, BEING SO FIGETY OR RESTLESS THAT YOU HAVE BEEN MOVING AROUND A LOT MORE THAN USUAL: NOT AT ALL
10. IF YOU CHECKED OFF ANY PROBLEMS, HOW DIFFICULT HAVE THESE PROBLEMS MADE IT FOR YOU TO DO YOUR WORK, TAKE CARE OF THINGS AT HOME, OR GET ALONG WITH OTHER PEOPLE: SOMEWHAT DIFFICULT
4. FEELING TIRED OR HAVING LITTLE ENERGY: NEARLY EVERY DAY

## 2024-09-10 ENCOUNTER — TELEPHONE (OUTPATIENT)
Dept: FAMILY MEDICINE CLINIC | Age: 84
End: 2024-09-10

## 2024-11-04 RX ORDER — ATORVASTATIN CALCIUM 40 MG/1
40 TABLET, FILM COATED ORAL DAILY
Qty: 90 TABLET | Refills: 1 | Status: SHIPPED | OUTPATIENT
Start: 2024-11-04

## 2024-11-11 DIAGNOSIS — E11.42 TYPE 2 DIABETES MELLITUS WITH DIABETIC POLYNEUROPATHY, WITHOUT LONG-TERM CURRENT USE OF INSULIN (HCC): ICD-10-CM

## 2024-11-11 RX ORDER — BLOOD-GLUCOSE METER
KIT MISCELLANEOUS
Qty: 50 STRIP | Refills: 3 | Status: SHIPPED | OUTPATIENT
Start: 2024-11-11

## 2024-11-15 RX ORDER — HYDROCHLOROTHIAZIDE 12.5 MG/1
12.5 CAPSULE ORAL DAILY
Qty: 90 CAPSULE | Refills: 1 | Status: SHIPPED | OUTPATIENT
Start: 2024-11-15

## 2024-12-17 DIAGNOSIS — I10 ESSENTIAL HYPERTENSION: ICD-10-CM

## 2024-12-17 RX ORDER — AMLODIPINE BESYLATE 2.5 MG/1
2.5 TABLET ORAL DAILY
Qty: 90 TABLET | Refills: 1 | Status: SHIPPED | OUTPATIENT
Start: 2024-12-17

## 2025-01-12 ENCOUNTER — HOSPITAL ENCOUNTER (EMERGENCY)
Age: 85
Discharge: HOME | End: 2025-01-12
Payer: MEDICARE

## 2025-01-12 VITALS
TEMPERATURE: 97.8 F | WEIGHT: 227 LBS | RESPIRATION RATE: 18 BRPM | HEART RATE: 72 BPM | SYSTOLIC BLOOD PRESSURE: 173 MMHG | OXYGEN SATURATION: 99 % | DIASTOLIC BLOOD PRESSURE: 91 MMHG | HEIGHT: 70 IN | BODY MASS INDEX: 32.5 KG/M2

## 2025-01-12 DIAGNOSIS — N40.0 ENLARGED PROSTATE: ICD-10-CM

## 2025-01-12 DIAGNOSIS — I10 ELEVATED BLOOD PRESSURE READING WITH DIAGNOSIS OF HYPERTENSION: ICD-10-CM

## 2025-01-12 DIAGNOSIS — R33.8 ACUTE URINARY RETENTION: Primary | ICD-10-CM

## 2025-01-12 LAB
ALBUMIN SERPL-MCNC: 4.2 G/DL (ref 3.4–5)
ALBUMIN/GLOB SERPL: 1.4 {RATIO} (ref 1.1–2.2)
ALP SERPL-CCNC: 96 U/L (ref 40–129)
ALT SERPL-CCNC: 25 U/L (ref 10–40)
ANION GAP SERPL CALCULATED.3IONS-SCNC: 12 MMOL/L (ref 3–16)
AST SERPL-CCNC: 25 U/L (ref 15–37)
BASOPHILS # BLD: 0.1 K/UL (ref 0–0.2)
BASOPHILS NFR BLD: 0.9 %
BILIRUB SERPL-MCNC: 0.9 MG/DL (ref 0–1)
BILIRUB UR QL STRIP.AUTO: NEGATIVE
BUN SERPL-MCNC: 22 MG/DL (ref 7–20)
CALCIUM SERPL-MCNC: 9.4 MG/DL (ref 8.3–10.6)
CHARACTER UR: ABNORMAL
CHLORIDE SERPL-SCNC: 107 MMOL/L (ref 99–110)
CLARITY UR: ABNORMAL
CO2 SERPL-SCNC: 21 MMOL/L (ref 21–32)
COLOR UR: ABNORMAL
CREAT SERPL-MCNC: 1.2 MG/DL (ref 0.8–1.3)
DEPRECATED RDW RBC AUTO: 14.3 % (ref 12.4–15.4)
EOSINOPHIL # BLD: 0.1 K/UL (ref 0–0.6)
EOSINOPHIL NFR BLD: 1.9 %
GFR SERPLBLD CREATININE-BSD FMLA CKD-EPI: 60 ML/MIN/{1.73_M2}
GLUCOSE SERPL-MCNC: 131 MG/DL (ref 70–99)
GLUCOSE UR STRIP.AUTO-MCNC: NEGATIVE MG/DL
HCT VFR BLD AUTO: 38.7 % (ref 40.5–52.5)
HGB BLD-MCNC: 13.3 G/DL (ref 13.5–17.5)
HGB UR QL STRIP.AUTO: ABNORMAL
KETONES UR STRIP.AUTO-MCNC: NEGATIVE MG/DL
LEUKOCYTE ESTERASE UR QL STRIP.AUTO: NEGATIVE
LYMPHOCYTES # BLD: 2.6 K/UL (ref 1–5.1)
LYMPHOCYTES NFR BLD: 41.3 %
MCH RBC QN AUTO: 31.8 PG (ref 26–34)
MCHC RBC AUTO-ENTMCNC: 34.5 G/DL (ref 31–36)
MCV RBC AUTO: 92.3 FL (ref 80–100)
MONOCYTES # BLD: 0.9 K/UL (ref 0–1.3)
MONOCYTES NFR BLD: 14.6 %
NEUTROPHILS # BLD: 2.6 K/UL (ref 1.7–7.7)
NEUTROPHILS NFR BLD: 41.3 %
NITRITE UR QL STRIP.AUTO: NEGATIVE
PH UR STRIP.AUTO: 6.5 [PH] (ref 5–8)
PLATELET # BLD AUTO: 206 K/UL (ref 135–450)
PMV BLD AUTO: 8.5 FL (ref 5–10.5)
POTASSIUM SERPL-SCNC: 4.2 MMOL/L (ref 3.5–5.1)
PROT SERPL-MCNC: 7.3 G/DL (ref 6.4–8.2)
PROT UR STRIP.AUTO-MCNC: ABNORMAL MG/DL
RBC # BLD AUTO: 4.19 M/UL (ref 4.2–5.9)
RBC #/AREA URNS HPF: >100 /HPF (ref 0–4)
SODIUM SERPL-SCNC: 140 MMOL/L (ref 136–145)
SP GR UR STRIP.AUTO: 1.01 (ref 1–1.03)
UA COMPLETE W REFLEX CULTURE PNL UR: ABNORMAL
UA DIPSTICK W REFLEX MICRO PNL UR: YES
URN SPEC COLLECT METH UR: ABNORMAL
UROBILINOGEN UR STRIP-ACNC: 0.2 E.U./DL
WBC # BLD AUTO: 6.2 K/UL (ref 4–11)
WBC #/AREA URNS HPF: ABNORMAL /HPF (ref 0–5)

## 2025-01-12 PROCEDURE — 80053 COMPREHEN METABOLIC PANEL: CPT

## 2025-01-12 PROCEDURE — 85025 COMPLETE CBC W/AUTO DIFF WBC: CPT

## 2025-01-12 PROCEDURE — 81001 URINALYSIS AUTO W/SCOPE: CPT

## 2025-01-12 PROCEDURE — 99283 EMERGENCY DEPT VISIT LOW MDM: CPT

## 2025-01-12 PROCEDURE — 6370000000 HC RX 637 (ALT 250 FOR IP): Performed by: GENERAL ACUTE CARE HOSPITAL

## 2025-01-12 RX ORDER — PHENAZOPYRIDINE HYDROCHLORIDE 100 MG/1
TABLET, FILM COATED ORAL
Status: DISCONTINUED
Start: 2025-01-12 | End: 2025-01-12 | Stop reason: HOSPADM

## 2025-01-12 RX ORDER — CEPHALEXIN 500 MG/1
500 CAPSULE ORAL 3 TIMES DAILY
Qty: 21 CAPSULE | Refills: 0 | Status: SHIPPED | OUTPATIENT
Start: 2025-01-12 | End: 2025-01-19

## 2025-01-12 RX ORDER — LIDOCAINE HYDROCHLORIDE 20 MG/ML
JELLY TOPICAL ONCE
Status: DISCONTINUED | OUTPATIENT
Start: 2025-01-12 | End: 2025-01-12 | Stop reason: HOSPADM

## 2025-01-12 RX ORDER — PHENAZOPYRIDINE HYDROCHLORIDE 100 MG/1
200 TABLET, FILM COATED ORAL ONCE
Status: COMPLETED | OUTPATIENT
Start: 2025-01-12 | End: 2025-01-12

## 2025-01-12 RX ORDER — TRAMADOL HYDROCHLORIDE 50 MG/1
50 TABLET ORAL ONCE
Status: COMPLETED | OUTPATIENT
Start: 2025-01-12 | End: 2025-01-12

## 2025-01-12 RX ADMIN — PHENAZOPYRIDINE 200 MG: 100 TABLET ORAL at 08:28

## 2025-01-12 RX ADMIN — TRAMADOL HYDROCHLORIDE 50 MG: 50 TABLET ORAL at 08:28

## 2025-01-12 NOTE — ED PROVIDER NOTES
to return immediately for worsening/change in symptoms.     The patient's blood pressure was found to be elevated according to CMS/Medicare and the Affordable Care Act/[facility] criteria. Elevated blood pressure could occur because of pain or anxiety or other reasons and does not mean that they need to have their blood pressure treated or medications otherwise adjusted. However, this could also be a sign that they will need to have their blood pressure treated or medications changed.     The patient was instructed to follow up closely with their personal physician to have their blood pressure rechecked. The patient was instructed to take a list of recent blood pressure readings to their next visit with their personal physician.           Disposition Considerations (Tests not ordered but considered, Shared Decision Making, Pt Expectation of Test or Tx.):            The patient tolerated their visit well.  I evaluated the patient.  The physician was available for consultation as needed.  The patient and / or the family were informed of the results of any tests, a time was given to answer questions, a plan was proposed and they agreed with plan.     I am the Primary Clinician of Record.     CLINICAL IMPRESSION:  1. Acute urinary retention    2. Enlarged prostate    3. Elevated blood pressure reading with diagnosis of hypertension        DISPOSITION Decision To Discharge 01/12/2025 09:34:09 AM   DISPOSITION CONDITION Stable           PATIENT REFERRED TO:  Yuriy Gongora MD  [address] [facility] [address]  [address]  [place] [address]  [phone]    In 2 days      Louie Argueta MD  [address] [facility]  [address]  [place] [address]  [phone]            DISCHARGE MEDICATIONS:  Discharge Medication List as of 1/12/2025  9:39 AM        START taking these medications    Details   cephALEXin (KEFLEX) 500 MG capsule Take 1 capsule by mouth 3 times daily for 7 days, Disp-21 capsule, R-0Normal

## 2025-01-12 NOTE — DISCHARGE INSTRUCTIONS
Continue home medications as directed.    Take the prescribed Keflex as directed until gone.    Leave Moore catheter in place until seen by urology.    Call urology office first thing tomorrow morning to arrange for close outpatient follow-up appointment.    Return for high fever, incessant vomiting, severe pain, or any other worsening symptoms.

## 2025-01-16 ENCOUNTER — HOSPITAL ENCOUNTER (EMERGENCY)
Age: 85
Discharge: HOME | End: 2025-01-16
Attending: STUDENT IN AN ORGANIZED HEALTH CARE EDUCATION/TRAINING PROGRAM
Payer: MEDICARE

## 2025-01-16 VITALS
WEIGHT: 225 LBS | DIASTOLIC BLOOD PRESSURE: 68 MMHG | HEIGHT: 70 IN | TEMPERATURE: 97.1 F | BODY MASS INDEX: 32.21 KG/M2 | OXYGEN SATURATION: 98 % | RESPIRATION RATE: 18 BRPM | HEART RATE: 84 BPM | SYSTOLIC BLOOD PRESSURE: 135 MMHG

## 2025-01-16 DIAGNOSIS — T83.9XXA PROBLEM WITH FOLEY CATHETER, INITIAL ENCOUNTER (HCC): Primary | ICD-10-CM

## 2025-01-16 PROCEDURE — 99282 EMERGENCY DEPT VISIT SF MDM: CPT

## 2025-01-16 ASSESSMENT — LIFESTYLE VARIABLES
HOW MANY STANDARD DRINKS CONTAINING ALCOHOL DO YOU HAVE ON A TYPICAL DAY: 1 OR 2
HOW OFTEN DO YOU HAVE A DRINK CONTAINING ALCOHOL: MONTHLY OR LESS

## 2025-01-16 NOTE — ED PROVIDER NOTES
[facility] EMERGENCY DEPARTMENT  EMERGENCY DEPARTMENT ENCOUNTER      Pt Name: Joseph Graff  MRN: [id number]  Birthdate 1940  Date of evaluation: 1/16/2025  Provider: Jaswinder Alarcon MD    CHIEF COMPLAINT       Chief Complaint   Patient presents with    Urinary Retention     Pt to ED with wife c/o urinary retention, states he was seen here Sunday for the same issue and had a catheter placed, states he had bloody urine for two days, states the blood and urine both stopped last night. Pt states he has an appt for urology at 10.         HISTORY OF PRESENT ILLNESS   (Location/Symptom, Timing/Onset, Context/Setting, Quality, Duration, Modifying Factors, Severity)  Note limiting factors.   Joseph Graff is a 84 y.o. male who presents to the emergency department with Moore catheter not draining.  Catheter stopped draining last night.  No blood.  No fevers or chills.  Patient had catheter placed 4 days ago for urinary retention.  Has follow-up with urology today at 10 AM for voiding trial.    Chart reviewed: Patient has a history of hyperlipidemia, diabetes, hypertension, prostatomegaly.  Follows with Dr. Kirkland with urology.  Had Moore catheter placed 4 days ago in the emergency room for urinary retention.  Nursing Notes were reviewed.    REVIEW OF SYSTEMS    (2-9 systems for level 4, 10 or more for level 5)     Review of Systems    Except as noted above the remainder of the review of systems was reviewed and negative.       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

## 2025-01-16 NOTE — ED NOTES
Bladder scanned patient for 477.  Attempted to irrigate the null.  Was not able to get anything out.  Deflated balloon.  Was able to advance it back into the bladder and get urine return.  Balloon inflated balloon again 500 ml of urine returned

## 2025-01-16 NOTE — ED NOTES
Patient discharged to home in stable condition with family via private car.  Discharge instructions reviewed with patient and family members.   Patient and family verbalized understanding.   Patient and family verbalized understanding.   All belongings in tow including discharge paperwork.       Patient alert and oriented.  Skin appropriate for ethnicity, dry and intact.  No signs of acute distress noted at this time. Regular respiratory pattern, normal respiratory depth, unlabored respirations.

## 2025-01-22 ENCOUNTER — TELEPHONE (OUTPATIENT)
Dept: FAMILY MEDICINE CLINIC | Age: 85
End: 2025-01-22

## 2025-01-22 NOTE — TELEPHONE ENCOUNTER
Called and lvm for [facility] with Fidelina (surgery scheduler) at [phone] to get info on upcoming Pre Op Physical on Friday.  Fidelina calls back.  Patient having Robotic Simple Prostatectomy on: 2/7/25 with Dr.Eric Kirkland  Fax:[phone]

## 2025-01-24 ENCOUNTER — HOSPITAL ENCOUNTER (OUTPATIENT)
Age: 85
Discharge: HOME | End: 2025-01-24
Payer: MEDICARE

## 2025-01-24 ENCOUNTER — OFFICE VISIT (OUTPATIENT)
Dept: FAMILY MEDICINE CLINIC | Age: 85
End: 2025-01-24

## 2025-01-24 ENCOUNTER — HOSPITAL ENCOUNTER (OUTPATIENT)
Dept: GENERAL RADIOLOGY | Age: 85
Discharge: HOME | End: 2025-01-24
Attending: FAMILY MEDICINE
Payer: MEDICARE

## 2025-01-24 VITALS
WEIGHT: 220 LBS | DIASTOLIC BLOOD PRESSURE: 70 MMHG | HEART RATE: 79 BPM | OXYGEN SATURATION: 96 % | RESPIRATION RATE: 18 BRPM | BODY MASS INDEX: 31.57 KG/M2 | SYSTOLIC BLOOD PRESSURE: 108 MMHG

## 2025-01-24 DIAGNOSIS — N40.1 BENIGN PROSTATIC HYPERPLASIA WITH URINARY RETENTION: ICD-10-CM

## 2025-01-24 DIAGNOSIS — R05.3 CHRONIC COUGH: ICD-10-CM

## 2025-01-24 DIAGNOSIS — N50.89 MASS OF LEFT TESTICLE: ICD-10-CM

## 2025-01-24 DIAGNOSIS — Z01.818 PREOP EXAMINATION: Primary | ICD-10-CM

## 2025-01-24 DIAGNOSIS — E11.42 TYPE 2 DIABETES MELLITUS WITH DIABETIC POLYNEUROPATHY, WITHOUT LONG-TERM CURRENT USE OF INSULIN (HCC): ICD-10-CM

## 2025-01-24 DIAGNOSIS — I10 ESSENTIAL HYPERTENSION: ICD-10-CM

## 2025-01-24 DIAGNOSIS — R33.8 BENIGN PROSTATIC HYPERPLASIA WITH URINARY RETENTION: ICD-10-CM

## 2025-01-24 DIAGNOSIS — E78.5 DYSLIPIDEMIA: ICD-10-CM

## 2025-01-24 PROBLEM — K64.4 EXTERNAL HEMORRHOIDS: Status: RESOLVED | Noted: 2018-10-23 | Resolved: 2025-01-24

## 2025-01-24 PROCEDURE — 71046 X-RAY EXAM CHEST 2 VIEWS: CPT

## 2025-01-24 SDOH — ECONOMIC STABILITY: FOOD INSECURITY: WITHIN THE PAST 12 MONTHS, THE FOOD YOU BOUGHT JUST DIDN'T LAST AND YOU DIDN'T HAVE MONEY TO GET MORE.: NEVER TRUE

## 2025-01-24 SDOH — ECONOMIC STABILITY: FOOD INSECURITY: WITHIN THE PAST 12 MONTHS, YOU WORRIED THAT YOUR FOOD WOULD RUN OUT BEFORE YOU GOT MONEY TO BUY MORE.: NEVER TRUE

## 2025-01-24 ASSESSMENT — PATIENT HEALTH QUESTIONNAIRE - PHQ9
4. FEELING TIRED OR HAVING LITTLE ENERGY: NOT AT ALL
9. THOUGHTS THAT YOU WOULD BE BETTER OFF DEAD, OR OF HURTING YOURSELF: NOT AT ALL
1. LITTLE INTEREST OR PLEASURE IN DOING THINGS: NOT AT ALL
7. TROUBLE CONCENTRATING ON THINGS, SUCH AS READING THE NEWSPAPER OR WATCHING TELEVISION: NOT AT ALL
2. FEELING DOWN, DEPRESSED OR HOPELESS: NOT AT ALL
SUM OF ALL RESPONSES TO PHQ9 QUESTIONS 1 & 2: 0
SUM OF ALL RESPONSES TO PHQ QUESTIONS 1-9: 0
5. POOR APPETITE OR OVEREATING: NOT AT ALL
6. FEELING BAD ABOUT YOURSELF - OR THAT YOU ARE A FAILURE OR HAVE LET YOURSELF OR YOUR FAMILY DOWN: NOT AT ALL
SUM OF ALL RESPONSES TO PHQ QUESTIONS 1-9: 0
10. IF YOU CHECKED OFF ANY PROBLEMS, HOW DIFFICULT HAVE THESE PROBLEMS MADE IT FOR YOU TO DO YOUR WORK, TAKE CARE OF THINGS AT HOME, OR GET ALONG WITH OTHER PEOPLE: NOT DIFFICULT AT ALL
3. TROUBLE FALLING OR STAYING ASLEEP: NOT AT ALL
8. MOVING OR SPEAKING SO SLOWLY THAT OTHER PEOPLE COULD HAVE NOTICED. OR THE OPPOSITE, BEING SO FIGETY OR RESTLESS THAT YOU HAVE BEEN MOVING AROUND A LOT MORE THAN USUAL: NOT AT ALL
SUM OF ALL RESPONSES TO PHQ QUESTIONS 1-9: 0
SUM OF ALL RESPONSES TO PHQ QUESTIONS 1-9: 0

## 2025-01-24 ASSESSMENT — ENCOUNTER SYMPTOMS
EYES NEGATIVE: 1
COUGH: 1
GASTROINTESTINAL NEGATIVE: 1
ALLERGIC/IMMUNOLOGIC NEGATIVE: 1

## 2025-01-24 NOTE — PROGRESS NOTES
Subjective:   PREOPERATIVE EVALUATION     Joseph Graff is a 84 y.o.  male who presents to the office today for a preoperative consultation at the request of surgeon Dr En Kirkland who plans on performing TURP on February 7.      Duration of problem: bladder obstruction with urinary retention several times the past month.  Hx BPH.     Alleviating factors are: had null placement and replacement.  Presently is without a null and is able to empty bladder.  Mild urinary incontinence.    This consultation is requested for the specific conditions prompting preoperative evaluation (i.e. because of potential affect on operative risk):    Diabetes: yes. Diet controlled.        Lab Results   Component Value Date/Time    LABA1C 6.4 03/07/2024 11:22 AM    LABA1C 6.0 09/07/2023 10:55 AM    LABA1C 5.7 05/11/2023 12:15 PM        Coronary Artery Disease:  no    COPD/Asthma:  no    Tobacco use?  No    Recent illness?  Report a chronic cough since late December.  Improves with mucinex.  minimal phlegm  No fever.  Worse overnight.   He does have periodic GERD symptoms, managed with tums.    Other notable conditions:    HTN: on amlodipine 2.5, HCTZ 12.5,   Last renal function test:   Lab Results   Component Value Date/Time     01/12/2025 08:19 AM    K 4.2 01/12/2025 08:19 AM     01/12/2025 08:19 AM    CO2 21 01/12/2025 08:19 AM    BUN 22 01/12/2025 08:19 AM    CREATININE 1.2 01/12/2025 08:19 AM    GLUCOSE 131 01/12/2025 08:19 AM    CALCIUM 9.4 01/12/2025 08:19 AM    LABGLOM 60 01/12/2025 08:19 AM    LABGLOM >60 03/07/2024 11:34 AM          Depression: no medical treatment.    Testicular swelling the past few days. Started after last appt with Urology.  But it is resolving he believes.    BPH: takes proscar and Flomax.    Hx of partial pancreatectomy/splenectomy for neuroendocrine tumor. In 2014. No recurrence or complications    Remote hx of sarcoidosis. He is asymptomatic and no longer follows with

## 2025-01-24 NOTE — PATIENT INSTRUCTIONS
Meds to take on the AM of surgery with sips of water:    Amlodipine 2.5 mg  Tamsulosin 0.4 mg  Omeprazole 20 mg  Mucinex 1200 mg    (For prilosec, take it first thing in the morning on an empty stomach, then you must eat something 30-60 min later).  If it does not improve your cough, you may stop it after your surgery.

## 2025-01-24 NOTE — ASSESSMENT & PLAN NOTE
- BP stable on current regimen.  Recent BMP normal.  Take Norvasc the AM of surgery but hold HCTZ dose that day.

## 2025-03-10 ENCOUNTER — OFFICE VISIT (OUTPATIENT)
Dept: FAMILY MEDICINE CLINIC | Age: 85
End: 2025-03-10
Payer: MEDICARE

## 2025-03-10 ENCOUNTER — HOSPITAL ENCOUNTER (OUTPATIENT)
Dept: CT IMAGING | Age: 85
Discharge: HOME | End: 2025-03-10
Attending: FAMILY MEDICINE
Payer: MEDICARE

## 2025-03-10 ENCOUNTER — RESULTS FOLLOW-UP (OUTPATIENT)
Dept: CT IMAGING | Age: 85
End: 2025-03-10

## 2025-03-10 VITALS
WEIGHT: 207.8 LBS | BODY MASS INDEX: 29.75 KG/M2 | SYSTOLIC BLOOD PRESSURE: 102 MMHG | HEIGHT: 70 IN | OXYGEN SATURATION: 97 % | RESPIRATION RATE: 16 BRPM | HEART RATE: 83 BPM | DIASTOLIC BLOOD PRESSURE: 61 MMHG

## 2025-03-10 DIAGNOSIS — Z09 HOSPITAL DISCHARGE FOLLOW-UP: Primary | ICD-10-CM

## 2025-03-10 DIAGNOSIS — E11.42 TYPE 2 DIABETES MELLITUS WITH DIABETIC POLYNEUROPATHY, WITHOUT LONG-TERM CURRENT USE OF INSULIN (HCC): ICD-10-CM

## 2025-03-10 DIAGNOSIS — R06.02 SHORTNESS OF BREATH: ICD-10-CM

## 2025-03-10 DIAGNOSIS — I10 ESSENTIAL HYPERTENSION: ICD-10-CM

## 2025-03-10 DIAGNOSIS — I28.8 DILATION OF PULMONARY ARTERY (HCC): ICD-10-CM

## 2025-03-10 DIAGNOSIS — R06.02 SHORTNESS OF BREATH: Primary | ICD-10-CM

## 2025-03-10 DIAGNOSIS — N28.9 ACUTE RENAL INSUFFICIENCY: ICD-10-CM

## 2025-03-10 DIAGNOSIS — Z90.79 S/P PROSTATECTOMY: ICD-10-CM

## 2025-03-10 DIAGNOSIS — I82.403 LEG DVT (DEEP VENOUS THROMBOEMBOLISM), ACUTE, BILATERAL (HCC): ICD-10-CM

## 2025-03-10 DIAGNOSIS — Z95.828 S/P IVC FILTER: ICD-10-CM

## 2025-03-10 LAB
HBA1C MFR BLD: 5.8 %
PERFORMED ON: ABNORMAL
POC CREATININE: 1.7 MG/DL (ref 0.8–1.3)
POC SAMPLE TYPE: ABNORMAL

## 2025-03-10 PROCEDURE — 99215 OFFICE O/P EST HI 40 MIN: CPT | Performed by: FAMILY MEDICINE

## 2025-03-10 PROCEDURE — G8417 CALC BMI ABV UP PARAM F/U: HCPCS | Performed by: FAMILY MEDICINE

## 2025-03-10 PROCEDURE — 82565 ASSAY OF CREATININE: CPT

## 2025-03-10 PROCEDURE — 1160F RVW MEDS BY RX/DR IN RCRD: CPT | Performed by: FAMILY MEDICINE

## 2025-03-10 PROCEDURE — 1123F ACP DISCUSS/DSCN MKR DOCD: CPT | Performed by: FAMILY MEDICINE

## 2025-03-10 PROCEDURE — 1111F DSCHRG MED/CURRENT MED MERGE: CPT | Performed by: FAMILY MEDICINE

## 2025-03-10 PROCEDURE — 3044F HG A1C LEVEL LT 7.0%: CPT | Performed by: FAMILY MEDICINE

## 2025-03-10 PROCEDURE — 3074F SYST BP LT 130 MM HG: CPT | Performed by: FAMILY MEDICINE

## 2025-03-10 PROCEDURE — 3078F DIAST BP <80 MM HG: CPT | Performed by: FAMILY MEDICINE

## 2025-03-10 PROCEDURE — G8427 DOCREV CUR MEDS BY ELIG CLIN: HCPCS | Performed by: FAMILY MEDICINE

## 2025-03-10 PROCEDURE — G2211 COMPLEX E/M VISIT ADD ON: HCPCS | Performed by: FAMILY MEDICINE

## 2025-03-10 PROCEDURE — 6360000004 HC RX CONTRAST MEDICATION: Performed by: FAMILY MEDICINE

## 2025-03-10 PROCEDURE — 71260 CT THORAX DX C+: CPT

## 2025-03-10 PROCEDURE — 1159F MED LIST DOCD IN RCRD: CPT | Performed by: FAMILY MEDICINE

## 2025-03-10 PROCEDURE — 1036F TOBACCO NON-USER: CPT | Performed by: FAMILY MEDICINE

## 2025-03-10 RX ORDER — OXYCODONE HYDROCHLORIDE 5 MG/1
5 TABLET ORAL EVERY 4 HOURS PRN
COMMUNITY
Start: 2025-02-25

## 2025-03-10 RX ORDER — CIPROFLOXACIN 250 MG/1
TABLET, FILM COATED ORAL
COMMUNITY
Start: 2025-02-17 | End: 2025-03-10 | Stop reason: ALTCHOICE

## 2025-03-10 RX ORDER — IOPAMIDOL 755 MG/ML
75 INJECTION, SOLUTION INTRAVASCULAR
Status: COMPLETED | OUTPATIENT
Start: 2025-03-10 | End: 2025-03-10

## 2025-03-10 RX ADMIN — IOPAMIDOL 75 ML: 755 INJECTION, SOLUTION INTRAVENOUS at 13:57

## 2025-03-10 NOTE — PROGRESS NOTES
Post-Discharge Transitional Care Follow Up      Joseph Graff   YOB: 1940    Date of Office Visit:  3/10/2025  Date of Hospital Admission: 1/16/25  Date of Hospital Discharge: 1/16/25  Readmission Risk Score (high >=14%. Medium >=10%):No data recorded    Care management risk score Rising risk (score 2-5) and Complex Care (Scores >=6): No Risk Score On File     Non face to face  following discharge, date last encounter closed (first attempt may have been earlier): *No documented post hospital discharge outreach found in the last 14 days     Call initiated 2 business days of discharge: *No response recorded in the last 14 days     1. Hospital discharge follow-up  - still has symptoms to be evaluated today  - [place] DISCHARGE MEDS RECONCILED W/ CURRENT OUTPATIENT MED LIST    2. Type 2 diabetes mellitus with diabetic polyneuropathy, without long-term current use of insulin (HCC)  - A1c is 5.8 today and stable without glycemic medicines    3. Leg DVT (deep venous thromboembolism), acute, bilateral (HCC)  - s/p IVC filter due to unable to anticoagulate due to hematuria and prostate hematoma s/p prostatectomy.  - leg pain controlled with compression wraps thankfully.  - hopefully can meet with urology soon and get clearance for anticoagulation.  - due to his worsening SOB and not being anticoagulated (and not having pulm artery imaging prior to IVC filter placement), I will obtain a CT PE chest today.  - CT CHEST PULMONARY EMBOLISM W CONTRAST; Future    4. Shortness of breath  - may be longstanding (CT abd suggested PA artery dilation 2/9/25), but could be worsened by pulm embolism (his DVTs are very extensive) and he is not anticoagulated.  - CT CHEST PULMONARY EMBOLISM W CONTRAST; Future    5. Essential hypertension  - BP stable currently with HCTZ 12.5    6. S/P prostatectomy  - urine flow reportedly good. To see urology in next 7-10 days in follow up.    7. S/P IVC filter  - referred to local vascular

## 2025-03-13 ENCOUNTER — RESULTS FOLLOW-UP (OUTPATIENT)
Dept: FAMILY MEDICINE CLINIC | Age: 85
End: 2025-03-13

## 2025-03-13 ENCOUNTER — LAB (OUTPATIENT)
Dept: FAMILY MEDICINE CLINIC | Age: 85
End: 2025-03-13
Payer: MEDICARE

## 2025-03-13 ENCOUNTER — TELEPHONE (OUTPATIENT)
Dept: FAMILY MEDICINE CLINIC | Age: 85
End: 2025-03-13

## 2025-03-13 DIAGNOSIS — I10 ESSENTIAL HYPERTENSION: ICD-10-CM

## 2025-03-13 DIAGNOSIS — R82.90 CLOUDY URINE: ICD-10-CM

## 2025-03-13 DIAGNOSIS — R06.02 SHORTNESS OF BREATH: ICD-10-CM

## 2025-03-13 DIAGNOSIS — Z87.440 HISTORY OF UTI: Primary | ICD-10-CM

## 2025-03-13 DIAGNOSIS — Z87.440 HISTORY OF UTI: ICD-10-CM

## 2025-03-13 LAB
ANION GAP SERPL CALCULATED.3IONS-SCNC: 12 MMOL/L (ref 3–16)
BILIRUBIN, POC: ABNORMAL
BLOOD URINE, POC: ABNORMAL
BUN SERPL-MCNC: 21 MG/DL (ref 7–20)
CALCIUM SERPL-MCNC: 9.6 MG/DL (ref 8.3–10.6)
CHLORIDE SERPL-SCNC: 103 MMOL/L (ref 99–110)
CLARITY, POC: ABNORMAL
CO2 SERPL-SCNC: 21 MMOL/L (ref 21–32)
COLOR, POC: YELLOW
CREAT SERPL-MCNC: 1.3 MG/DL (ref 0.8–1.3)
GFR SERPLBLD CREATININE-BSD FMLA CKD-EPI: 54 ML/MIN/{1.73_M2}
GLUCOSE SERPL-MCNC: 130 MG/DL (ref 70–99)
GLUCOSE URINE, POC: ABNORMAL MG/DL
KETONES, POC: ABNORMAL MG/DL
LEUKOCYTE EST, POC: ABNORMAL
NITRITE, POC: ABNORMAL
PH, POC: 6.5
POTASSIUM SERPL-SCNC: 4.7 MMOL/L (ref 3.5–5.1)
PROTEIN, POC: >300 MG/DL
SODIUM SERPL-SCNC: 136 MMOL/L (ref 136–145)
SPECIFIC GRAVITY, POC: 1.02
UROBILINOGEN, POC: 0.2 MG/DL

## 2025-03-13 PROCEDURE — 81002 URINALYSIS NONAUTO W/O SCOPE: CPT | Performed by: FAMILY MEDICINE

## 2025-03-13 NOTE — TELEPHONE ENCOUNTER
Joseph was in for labs today and came into office to leave urine sample.  I did not see order but collected urine.  He thought he was to get another urine test to make sure UTI (post-op in Feb) has resolved.  OK to dip and send out for culture?

## 2025-03-14 ENCOUNTER — HOSPITAL ENCOUNTER (OUTPATIENT)
Age: 85
Discharge: HOME | End: 2025-03-16
Payer: MEDICARE

## 2025-03-14 ENCOUNTER — RESULTS FOLLOW-UP (OUTPATIENT)
Dept: FAMILY MEDICINE CLINIC | Age: 85
End: 2025-03-14

## 2025-03-14 VITALS
DIASTOLIC BLOOD PRESSURE: 61 MMHG | HEIGHT: 70 IN | BODY MASS INDEX: 29.63 KG/M2 | SYSTOLIC BLOOD PRESSURE: 102 MMHG | WEIGHT: 207 LBS

## 2025-03-14 LAB
ECHO AO ROOT DIAM: 3 CM
ECHO AO ROOT INDEX: 1.42 CM/M2
ECHO AV AREA PEAK VELOCITY: 2.5 CM2
ECHO AV AREA VTI: 2.6 CM2
ECHO AV AREA/BSA PEAK VELOCITY: 1.2 CM2/M2
ECHO AV AREA/BSA VTI: 1.2 CM2/M2
ECHO AV MEAN GRADIENT: 4 MMHG
ECHO AV MEAN VELOCITY: 1 M/S
ECHO AV PEAK GRADIENT: 6 MMHG
ECHO AV PEAK VELOCITY: 1.2 M/S
ECHO AV VELOCITY RATIO: 0.75
ECHO AV VTI: 25.4 CM
ECHO BSA: 2.15 M2
ECHO EST RA PRESSURE: 3 MMHG
ECHO LA AREA 2C: 23.1 CM2
ECHO LA AREA 4C: 25.8 CM2
ECHO LA DIAMETER INDEX: 1.23 CM/M2
ECHO LA DIAMETER: 2.6 CM
ECHO LA MAJOR AXIS: 6.6 CM
ECHO LA MINOR AXIS: 6 CM
ECHO LA TO AORTIC ROOT RATIO: 0.87
ECHO LA VOL BP: 81 ML (ref 18–58)
ECHO LA VOL MOD A2C: 71 ML (ref 18–58)
ECHO LA VOL MOD A4C: 84 ML (ref 18–58)
ECHO LA VOL/BSA BIPLANE: 38 ML/M2 (ref 16–34)
ECHO LA VOLUME INDEX MOD A2C: 33 ML/M2 (ref 16–34)
ECHO LA VOLUME INDEX MOD A4C: 40 ML/M2 (ref 16–34)
ECHO LV E' LATERAL VELOCITY: 9.79 CM/S
ECHO LV E' SEPTAL VELOCITY: 7.18 CM/S
ECHO LV EDV A2C: 65 ML
ECHO LV EDV A4C: 74 ML
ECHO LV EDV INDEX A4C: 35 ML/M2
ECHO LV EDV NDEX A2C: 31 ML/M2
ECHO LV EF PHYSICIAN: 48 %
ECHO LV EJECTION FRACTION A2C: 60 %
ECHO LV EJECTION FRACTION A4C: 60 %
ECHO LV ESV A2C: 26 ML
ECHO LV ESV A4C: 30 ML
ECHO LV ESV INDEX A2C: 12 ML/M2
ECHO LV ESV INDEX A4C: 14 ML/M2
ECHO LV FRACTIONAL SHORTENING: 27 % (ref 28–44)
ECHO LV INTERNAL DIMENSION DIASTOLE INDEX: 2.26 CM/M2
ECHO LV INTERNAL DIMENSION DIASTOLIC: 4.8 CM (ref 4.2–5.9)
ECHO LV INTERNAL DIMENSION SYSTOLIC INDEX: 1.65 CM/M2
ECHO LV INTERNAL DIMENSION SYSTOLIC: 3.5 CM
ECHO LV IVSD: 0.8 CM (ref 0.6–1)
ECHO LV MASS 2D: 126.7 G (ref 88–224)
ECHO LV MASS INDEX 2D: 59.8 G/M2 (ref 49–115)
ECHO LV POSTERIOR WALL DIASTOLIC: 0.8 CM (ref 0.6–1)
ECHO LV RELATIVE WALL THICKNESS RATIO: 0.33
ECHO LVOT AREA: 3.1 CM2
ECHO LVOT AV VTI INDEX: 0.82
ECHO LVOT DIAM: 2 CM
ECHO LVOT MEAN GRADIENT: 2 MMHG
ECHO LVOT PEAK GRADIENT: 4 MMHG
ECHO LVOT PEAK VELOCITY: 0.9 M/S
ECHO LVOT STROKE VOLUME INDEX: 31 ML/M2
ECHO LVOT SV: 65.6 ML
ECHO LVOT VTI: 20.9 CM
ECHO MV A VELOCITY: 0.86 M/S
ECHO MV AREA VTI: 3.1 CM2
ECHO MV E DECELERATION TIME (DT): 211 MS
ECHO MV E VELOCITY: 0.55 M/S
ECHO MV E/A RATIO: 0.64
ECHO MV E/E' LATERAL: 5.62
ECHO MV E/E' RATIO (AVERAGED): 6.64
ECHO MV E/E' SEPTAL: 7.66
ECHO MV LVOT VTI INDEX: 1.01
ECHO MV MAX VELOCITY: 1 M/S
ECHO MV MEAN GRADIENT: 1 MMHG
ECHO MV MEAN VELOCITY: 0.5 M/S
ECHO MV PEAK GRADIENT: 4 MMHG
ECHO MV VTI: 21.2 CM
ECHO PV MAX VELOCITY: 0.7 M/S
ECHO PV MEAN GRADIENT: 1 MMHG
ECHO PV MEAN VELOCITY: 0.6 M/S
ECHO PV PEAK GRADIENT: 2 MMHG
ECHO PV VTI: 16 CM
ECHO RA AREA 4C: 17.1 CM2
ECHO RA END SYSTOLIC VOLUME APICAL 4 CHAMBER INDEX BSA: 20 ML/M2
ECHO RA VOLUME: 43 ML
ECHO RIGHT VENTRICULAR SYSTOLIC PRESSURE (RVSP): 19 MMHG
ECHO RV BASAL DIMENSION: 3.6 CM
ECHO RV FREE WALL PEAK S': 11.2 CM/S
ECHO RV INTERNAL DIMENSION: 4 CM
ECHO RV LONGITUDINAL DIMENSION: 8.4 CM
ECHO RV MID DIMENSION: 2.4 CM
ECHO RV TAPSE: 2.1 CM (ref 1.7–?)
ECHO TV REGURGITANT MAX VELOCITY: 2.02 M/S
ECHO TV REGURGITANT PEAK GRADIENT: 16 MMHG

## 2025-03-14 PROCEDURE — 93306 TTE W/DOPPLER COMPLETE: CPT

## 2025-03-14 PROCEDURE — 93306 TTE W/DOPPLER COMPLETE: CPT | Performed by: INTERNAL MEDICINE

## 2025-03-15 LAB
BACTERIA UR CULT: ABNORMAL
ORGANISM: ABNORMAL

## 2025-03-18 ENCOUNTER — TELEPHONE (OUTPATIENT)
Dept: CARDIOLOGY CLINIC | Age: 85
End: 2025-03-18

## 2025-03-18 NOTE — TELEPHONE ENCOUNTER
Wife, Erika called to schedule appt.     Referral for: Alin  Reason: Dilation of pulmonary artery (HCC)   Refer by: PCP    Scheduled for: 4/19 at 2 p.m.

## 2025-03-18 NOTE — TELEPHONE ENCOUNTER
Frederick RN   Please advise Registrars if there is a sooner opening for the patient to be evaluated per Dr. Ogden. Thanks.

## 2025-03-20 NOTE — TELEPHONE ENCOUNTER
Reviewed chart and discussed with Dr. Pal  Given he is s/p IVC filter due to unable to anticoagulate due to hematuria and prostate hematoma s/p prostatectomy, will have him see Dr. Pal.       I rescheduled him to see Dr. Pal  Please call him with new appointment date and time

## 2025-03-26 NOTE — PROGRESS NOTES
[facility]      Cardiology Consult    Joseph Graff  1940  March 26, 2025    Referring Physician: Yuriy Gongora MD  Reason for Referral: s/p IVC filter , SOB    CC: \"I feel ok\"    HPI:  The patient is 84 y.o. male with a past medical history significant for depression, DM, dizziness,HTN, hyperlipidemia.  IVC filter 2/25 after prostate surgery..    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)    SINUS SURGERY      SPLENECTOMY, TOTAL  03/11/2014    Dr Tello     Family History   Problem Relation Age of Onset    Heart Disease Father     Diabetes Other     High Blood Pressure Other      Social History     Tobacco Use    Smoking status: Never    Smokeless tobacco: Never   Substance Use Topics    Alcohol use: Yes     Alcohol/week: 0.0 standard drinks of alcohol     Comment: socially    Drug use: No       Allergies   Allergen Reactions    Pneumovax [Pneumococcal Polysaccharide Vaccine] Swelling     Arm swelling x 3 days    Ciprofloxacin Nausea And Vomiting     Current Outpatient Medications   Medication Sig Dispense Refill    oxyCODONE (ROXICODONE) 5 MG immediate release tablet Take 1 tablet by mouth every 4 hours as needed.      amLODIPine (NORVASC) 2.5 MG tablet TAKE 1 TABLET BY MOUTH DAILY 90 tablet 1    hydroCHLOROthiazide 12.5 MG capsule TAKE 1 CAPSULE BY MOUTH DAILY 90 capsule 1    FREESTYLE LITE strip USE TO TEST BLOOD SUGAR DAILY

## 2025-03-31 ENCOUNTER — TELEPHONE (OUTPATIENT)
Dept: CARDIOLOGY CLINIC | Age: 85
End: 2025-03-31

## 2025-03-31 ENCOUNTER — OFFICE VISIT (OUTPATIENT)
Dept: CARDIOLOGY CLINIC | Age: 85
End: 2025-03-31
Payer: MEDICARE

## 2025-03-31 VITALS
DIASTOLIC BLOOD PRESSURE: 62 MMHG | WEIGHT: 216 LBS | HEIGHT: 70 IN | HEART RATE: 94 BPM | SYSTOLIC BLOOD PRESSURE: 130 MMHG | BODY MASS INDEX: 30.92 KG/M2 | OXYGEN SATURATION: 97 %

## 2025-03-31 DIAGNOSIS — R06.02 SOB (SHORTNESS OF BREATH): Primary | ICD-10-CM

## 2025-03-31 DIAGNOSIS — R06.02 SHORTNESS OF BREATH: ICD-10-CM

## 2025-03-31 DIAGNOSIS — I27.20 PULMONARY HYPERTENSION (HCC): ICD-10-CM

## 2025-03-31 DIAGNOSIS — I11.0 HYPERTENSIVE HEART DISEASE WITH HEART FAILURE (HCC): ICD-10-CM

## 2025-03-31 DIAGNOSIS — R06.02 SOB (SHORTNESS OF BREATH): ICD-10-CM

## 2025-03-31 DIAGNOSIS — I82.413 ACUTE DEEP VEIN THROMBOSIS (DVT) OF FEMORAL VEIN OF BOTH LOWER EXTREMITIES: Primary | ICD-10-CM

## 2025-03-31 PROCEDURE — 3078F DIAST BP <80 MM HG: CPT | Performed by: STUDENT IN AN ORGANIZED HEALTH CARE EDUCATION/TRAINING PROGRAM

## 2025-03-31 PROCEDURE — 1159F MED LIST DOCD IN RCRD: CPT | Performed by: STUDENT IN AN ORGANIZED HEALTH CARE EDUCATION/TRAINING PROGRAM

## 2025-03-31 PROCEDURE — 3075F SYST BP GE 130 - 139MM HG: CPT | Performed by: STUDENT IN AN ORGANIZED HEALTH CARE EDUCATION/TRAINING PROGRAM

## 2025-03-31 PROCEDURE — G8427 DOCREV CUR MEDS BY ELIG CLIN: HCPCS | Performed by: STUDENT IN AN ORGANIZED HEALTH CARE EDUCATION/TRAINING PROGRAM

## 2025-03-31 PROCEDURE — 99204 OFFICE O/P NEW MOD 45 MIN: CPT | Performed by: STUDENT IN AN ORGANIZED HEALTH CARE EDUCATION/TRAINING PROGRAM

## 2025-03-31 PROCEDURE — 1036F TOBACCO NON-USER: CPT | Performed by: STUDENT IN AN ORGANIZED HEALTH CARE EDUCATION/TRAINING PROGRAM

## 2025-03-31 PROCEDURE — G2211 COMPLEX E/M VISIT ADD ON: HCPCS | Performed by: STUDENT IN AN ORGANIZED HEALTH CARE EDUCATION/TRAINING PROGRAM

## 2025-03-31 PROCEDURE — 1123F ACP DISCUSS/DSCN MKR DOCD: CPT | Performed by: STUDENT IN AN ORGANIZED HEALTH CARE EDUCATION/TRAINING PROGRAM

## 2025-03-31 PROCEDURE — 93000 ELECTROCARDIOGRAM COMPLETE: CPT | Performed by: STUDENT IN AN ORGANIZED HEALTH CARE EDUCATION/TRAINING PROGRAM

## 2025-03-31 PROCEDURE — G8417 CALC BMI ABV UP PARAM F/U: HCPCS | Performed by: STUDENT IN AN ORGANIZED HEALTH CARE EDUCATION/TRAINING PROGRAM

## 2025-03-31 NOTE — TELEPHONE ENCOUNTER
Rosalinda Tom you please schedule Right Heart Catheterization at [facility].      Labs and procedure is ordered.      Please go for blood work one week prior to your procedure.       The morning of your procedure you will park in the hospital parking lot and report directly to the registration desk for check in.    Pre-Procedure Instructions   You will need to fast for at least 8 hours prior to procedure. No caffeine the morning of.    Hold all diabetic medications including, Metfomin.  If you take Lantus/Levemir only take ½ your normal dose the evening before.  All other medications can be taken in the morning with sips of water.   You will need to take 325 mg aspirin the morning of.  If you are currently taking 81 mg please take 4 tablets that morning.   Do not use any lotions, creams or perfume the morning of procedure.   Pre-procedure lab work will need to be completed 5-7 days prior to procedure.   Please have a responsible adult to drive you home after procedure. We advise you have someone to stay with you for 24 hours following procedure for precautionary measures. Depending on procedure you may require an overnight stay.   Cath lab will provide you with all post procedure instructions.     If you have any questions regarding the procedure itself or medications, please call [phone] and ask to speak with a nurse.

## 2025-03-31 NOTE — TELEPHONE ENCOUNTER
Patients wife Erika called in.    She was wanting to schedule his procedure that was discussed during the OV today 3/31.     Lyle carbajal.     Callback: [phone]

## 2025-03-31 NOTE — PATIENT INSTRUCTIONS
Shortness of breath evaluation of the pressures of the heart and lungs.  Right heart Cath  Rosalinda will call you to schedule the procedure.  Outpatient     Ketan RN Call with any questions     Need to remove filter usually outpatient and we will remove clot with filter removed.

## 2025-04-01 NOTE — TELEPHONE ENCOUNTER
Date of Procedure: Wednesday 4/16/25 @ [facility] with Dr. Pal     Time of arrival: 9:00 am     Procedure time: 10:00 am     Called and spoke to Joseph's wife Erika and she is agreeable to date and time. Reviewed instructions and she verbalized understanding. Encouraged to call with any questions or concerns.     Published on [employer] and e-mail to Ana.

## 2025-04-02 PROBLEM — R06.02 SHORTNESS OF BREATH: Status: ACTIVE | Noted: 2025-03-31

## 2025-04-08 DIAGNOSIS — R06.02 SOB (SHORTNESS OF BREATH): ICD-10-CM

## 2025-04-08 LAB
ANION GAP SERPL CALCULATED.3IONS-SCNC: 11 MMOL/L (ref 3–16)
BUN SERPL-MCNC: 24 MG/DL (ref 7–20)
CALCIUM SERPL-MCNC: 9.9 MG/DL (ref 8.3–10.6)
CHLORIDE SERPL-SCNC: 106 MMOL/L (ref 99–110)
CO2 SERPL-SCNC: 25 MMOL/L (ref 21–32)
CREAT SERPL-MCNC: 1.2 MG/DL (ref 0.8–1.3)
DEPRECATED RDW RBC AUTO: 18.1 % (ref 12.4–15.4)
GFR SERPLBLD CREATININE-BSD FMLA CKD-EPI: 60 ML/MIN/{1.73_M2}
GLUCOSE SERPL-MCNC: 118 MG/DL (ref 70–99)
HCT VFR BLD AUTO: 38.4 % (ref 40.5–52.5)
HGB BLD-MCNC: 12.6 G/DL (ref 13.5–17.5)
MCH RBC QN AUTO: 29.6 PG (ref 26–34)
MCHC RBC AUTO-ENTMCNC: 32.8 G/DL (ref 31–36)
MCV RBC AUTO: 90.5 FL (ref 80–100)
PLATELET # BLD AUTO: 205 K/UL (ref 135–450)
PMV BLD AUTO: 9.4 FL (ref 5–10.5)
POTASSIUM SERPL-SCNC: 4.8 MMOL/L (ref 3.5–5.1)
RBC # BLD AUTO: 4.25 M/UL (ref 4.2–5.9)
SODIUM SERPL-SCNC: 142 MMOL/L (ref 136–145)
WBC # BLD AUTO: 8.7 K/UL (ref 4–11)

## 2025-04-16 ENCOUNTER — HOSPITAL ENCOUNTER (OUTPATIENT)
Age: 85
Setting detail: OUTPATIENT SURGERY
Discharge: HOME | End: 2025-04-16
Attending: STUDENT IN AN ORGANIZED HEALTH CARE EDUCATION/TRAINING PROGRAM | Admitting: STUDENT IN AN ORGANIZED HEALTH CARE EDUCATION/TRAINING PROGRAM
Payer: MEDICARE

## 2025-04-16 VITALS
BODY MASS INDEX: 30.66 KG/M2 | WEIGHT: 219 LBS | OXYGEN SATURATION: 97 % | SYSTOLIC BLOOD PRESSURE: 127 MMHG | HEIGHT: 71 IN | DIASTOLIC BLOOD PRESSURE: 71 MMHG | HEART RATE: 49 BPM

## 2025-04-16 DIAGNOSIS — R06.02 SHORTNESS OF BREATH: ICD-10-CM

## 2025-04-16 PROCEDURE — 2580000003 HC RX 258: Performed by: STUDENT IN AN ORGANIZED HEALTH CARE EDUCATION/TRAINING PROGRAM

## 2025-04-16 PROCEDURE — 93451 RIGHT HEART CATH: CPT | Performed by: STUDENT IN AN ORGANIZED HEALTH CARE EDUCATION/TRAINING PROGRAM

## 2025-04-16 PROCEDURE — 7100000011 HC PHASE II RECOVERY - ADDTL 15 MIN: Performed by: STUDENT IN AN ORGANIZED HEALTH CARE EDUCATION/TRAINING PROGRAM

## 2025-04-16 PROCEDURE — 75825 VEIN X-RAY TRUNK: CPT | Performed by: STUDENT IN AN ORGANIZED HEALTH CARE EDUCATION/TRAINING PROGRAM

## 2025-04-16 PROCEDURE — 99152 MOD SED SAME PHYS/QHP 5/>YRS: CPT | Performed by: STUDENT IN AN ORGANIZED HEALTH CARE EDUCATION/TRAINING PROGRAM

## 2025-04-16 PROCEDURE — 7100000010 HC PHASE II RECOVERY - FIRST 15 MIN: Performed by: STUDENT IN AN ORGANIZED HEALTH CARE EDUCATION/TRAINING PROGRAM

## 2025-04-16 PROCEDURE — 76937 US GUIDE VASCULAR ACCESS: CPT | Performed by: STUDENT IN AN ORGANIZED HEALTH CARE EDUCATION/TRAINING PROGRAM

## 2025-04-16 PROCEDURE — C1894 INTRO/SHEATH, NON-LASER: HCPCS | Performed by: STUDENT IN AN ORGANIZED HEALTH CARE EDUCATION/TRAINING PROGRAM

## 2025-04-16 PROCEDURE — C1769 GUIDE WIRE: HCPCS | Performed by: STUDENT IN AN ORGANIZED HEALTH CARE EDUCATION/TRAINING PROGRAM

## 2025-04-16 PROCEDURE — 2709999900 HC NON-CHARGEABLE SUPPLY: Performed by: STUDENT IN AN ORGANIZED HEALTH CARE EDUCATION/TRAINING PROGRAM

## 2025-04-16 PROCEDURE — C1751 CATH, INF, PER/CENT/MIDLINE: HCPCS | Performed by: STUDENT IN AN ORGANIZED HEALTH CARE EDUCATION/TRAINING PROGRAM

## 2025-04-16 PROCEDURE — 6360000004 HC RX CONTRAST MEDICATION: Performed by: STUDENT IN AN ORGANIZED HEALTH CARE EDUCATION/TRAINING PROGRAM

## 2025-04-16 PROCEDURE — 36010 PLACE CATHETER IN VEIN: CPT | Performed by: STUDENT IN AN ORGANIZED HEALTH CARE EDUCATION/TRAINING PROGRAM

## 2025-04-16 PROCEDURE — 6360000002 HC RX W HCPCS: Performed by: STUDENT IN AN ORGANIZED HEALTH CARE EDUCATION/TRAINING PROGRAM

## 2025-04-16 RX ORDER — SODIUM CHLORIDE 9 MG/ML
INJECTION, SOLUTION INTRAVENOUS PRN
Status: DISCONTINUED | OUTPATIENT
Start: 2025-04-16 | End: 2025-04-16 | Stop reason: HOSPADM

## 2025-04-16 RX ORDER — SODIUM CHLORIDE 9 MG/ML
INJECTION, SOLUTION INTRAVENOUS CONTINUOUS PRN
Status: COMPLETED | OUTPATIENT
Start: 2025-04-16 | End: 2025-04-16

## 2025-04-16 RX ORDER — IOPAMIDOL 755 MG/ML
INJECTION, SOLUTION INTRAVASCULAR PRN
Status: DISCONTINUED | OUTPATIENT
Start: 2025-04-16 | End: 2025-04-16 | Stop reason: HOSPADM

## 2025-04-16 RX ORDER — MIDAZOLAM HYDROCHLORIDE 1 MG/ML
INJECTION, SOLUTION INTRAMUSCULAR; INTRAVENOUS PRN
Status: DISCONTINUED | OUTPATIENT
Start: 2025-04-16 | End: 2025-04-16 | Stop reason: HOSPADM

## 2025-04-16 RX ORDER — FENTANYL CITRATE 50 UG/ML
INJECTION, SOLUTION INTRAMUSCULAR; INTRAVENOUS PRN
Status: DISCONTINUED | OUTPATIENT
Start: 2025-04-16 | End: 2025-04-16 | Stop reason: HOSPADM

## 2025-04-16 RX ORDER — SODIUM CHLORIDE 0.9 % (FLUSH) 0.9 %
5-40 SYRINGE (ML) INJECTION EVERY 12 HOURS SCHEDULED
Status: DISCONTINUED | OUTPATIENT
Start: 2025-04-16 | End: 2025-04-16 | Stop reason: HOSPADM

## 2025-04-16 RX ORDER — SODIUM CHLORIDE 0.9 % (FLUSH) 0.9 %
5-40 SYRINGE (ML) INJECTION PRN
Status: DISCONTINUED | OUTPATIENT
Start: 2025-04-16 | End: 2025-04-16 | Stop reason: HOSPADM

## 2025-04-16 NOTE — DISCHARGE INSTRUCTIONS
RIGHT HEART CATHETERIZATION THROUGH GROIN    Care of your puncture site:  Remove bandage 24 hours after the procedure.  May shower in 24 hours but do not sit in a bathtub/pool of water for 5 days or until the wound is healed.  Gently clean neck using soap and water.  Dry thoroughly and apply a Band-Aid that covers the entire site. Use Band-Aid until skin heals over in about 3-5 days.   Do not apply powder or lotion.    Limit walking and stair climbing today.    Normal Observations:  Soreness or tenderness which may last one week.  Mild oozing from the incision site.  Possible bruising that could last 2 weeks.    Activity:  You may resume driving 24 hours following the procedure.  Do not make important / legal decisions within 24 hours after procedure.  You may resume normal activity in 3 days or after the wound heals.  Avoid lifting more than 10 pounds for 3 days or until the wound heals.  Avoid strenuous exercise or activity for 1 week.    Nutrition:  Regular diet     Call your doctor immediately if your condition worsens, for any other concerns, for a follow-up appointment or if you experience any of the following:  Increased swelling in neck  Unusual pain, numbness, or tingling of neck  Any signs of infection such as: redness, yellow drainage at the site, swelling or pain.    IF NECK STARTS BLEEDING SIGNIFICANTLY:   LAY FLAT, HOLD FIRM DIRECT PRESSURE, AND CALL 911

## 2025-04-16 NOTE — PROGRESS NOTES
1035 Patient back from procedure    IJ site is clean, dry and intact.  No bleeding or hematoma    VSS, patient resting comfortably.  Will continue to monitor    Site remained stable, patient ambulated 1305    1310 IV d/c'd angio intact. Dressing applied    Discharge instructions reviewed with patient and family member.  Patient and family verbalized understanding.  New medications have been reviewed, questions answered and patient voiced understanding. All medication side effects reviewed and patient and family verbalized understanding. Follow up appointment(s) reviewed with patient and family.   Patient given discharge instructions, and appointment times.     1312 Taken to discharge bridge via wheelchair.  Patient discharged to home with family

## 2025-04-17 LAB — ECHO BSA: 2.23 M2

## 2025-04-18 ENCOUNTER — TELEPHONE (OUTPATIENT)
Dept: CARDIOLOGY CLINIC | Age: 85
End: 2025-04-18

## 2025-04-18 NOTE — TELEPHONE ENCOUNTER
Called Erika   She will  samples and apply for patient assistance  His script at the hospital would be $1000  Free #30 day sample coupon  Giving him samples    Last OV: 2025  Last Labs:2025  Last Refills:  Next Appt: N/A  Last [id number]

## 2025-04-18 NOTE — TELEPHONE ENCOUNTER
Erika(wife) called with questions regarding a referral to  for some testing. She relayed that they were told someone was going to call them once the appt was scheduled.    Erika also wanted to discuss options for his Eliquis. She relayed when she went to  the medication it was way too expensive.      Please advise.    Erika's callback: [phone]

## 2025-04-22 ENCOUNTER — TELEPHONE (OUTPATIENT)
Dept: CARDIOLOGY CLINIC | Age: 85
End: 2025-04-22

## 2025-04-22 DIAGNOSIS — R93.1 ABNORMAL FINDINGS ON DIAGNOSTIC IMAGING OF HEART AND CORONARY CIRCULATION: ICD-10-CM

## 2025-04-22 DIAGNOSIS — R07.9 CHEST PAIN, UNSPECIFIED TYPE: ICD-10-CM

## 2025-04-22 DIAGNOSIS — R06.02 SHORTNESS OF BREATH: Primary | ICD-10-CM

## 2025-04-22 RX ORDER — METOPROLOL TARTRATE 50 MG
TABLET ORAL
Qty: 3 TABLET | Refills: 0 | Status: SHIPPED | OUTPATIENT
Start: 2025-04-22

## 2025-04-22 RX ORDER — METOPROLOL TARTRATE 1 MG/ML
5 INJECTION, SOLUTION INTRAVENOUS EVERY 5 MIN PRN
OUTPATIENT
Start: 2025-04-22

## 2025-04-22 RX ORDER — SODIUM CHLORIDE 9 MG/ML
INJECTION, SOLUTION INTRAVENOUS PRN
OUTPATIENT
Start: 2025-04-22

## 2025-04-22 RX ORDER — NITROGLYCERIN 0.4 MG/1
0.8 TABLET SUBLINGUAL
OUTPATIENT
Start: 2025-04-22

## 2025-04-22 RX ORDER — NITROGLYCERIN 0.4 MG/1
0.4 TABLET SUBLINGUAL
OUTPATIENT
Start: 2025-04-22

## 2025-04-22 RX ORDER — SODIUM CHLORIDE 0.9 % (FLUSH) 0.9 %
5-40 SYRINGE (ML) INJECTION EVERY 12 HOURS SCHEDULED
OUTPATIENT
Start: 2025-04-22

## 2025-04-22 RX ORDER — SODIUM CHLORIDE 0.9 % (FLUSH) 0.9 %
5-40 SYRINGE (ML) INJECTION PRN
OUTPATIENT
Start: 2025-04-22

## 2025-04-22 NOTE — TELEPHONE ENCOUNTER
Medication Question/Concern    What is the name of the medication you need to speak with someone about?  apixaban (ELIQUIS)     Was this prescribed by your cardiologist?   Yes  Dosage of the medication:  5 MG TABS tablet   How are you taking this medication (QD, BID, TID, QID, PRN):  Take 1 tablet by mouth 2 times daily   What issues/concerns are you having with this medication?  Erika (wife) called the office to relay that Joseph has been taking this medication for two days and has started noticing that his urine has turned dark brown.     Please advise.    Callback: [phone]

## 2025-04-22 NOTE — TELEPHONE ENCOUNTER
Spoke with Erika and she states that the pt has had urinary frequency but this began prior to starting Eliquis.     Erika also has questions about further testing to be done at  for\"clots in his lungs\". Erika would like to discuss this further with RN.     Please advise. Thanks

## 2025-04-22 NOTE — TELEPHONE ENCOUNTER
Can you please call and schedule for Coronary CTA and Pulmonary Function Test at [facility].      Order is placed.

## 2025-04-22 NOTE — TELEPHONE ENCOUNTER
Per Venogram recommendations patient to have   Coronary CTA  Pulmonary Function test  Referral to Dr. José fro Sarcoid     Orders placed.  Instructed if no call by Dr. José office by Thursday call and schedule appt.  Spoke to patient and .  Verbalized understanding.      Per Erika and  patient urine has become clear.  Patient wants to continue to  take Eliquis.      Instructed patient to call with any questions or concerns.

## 2025-04-23 DIAGNOSIS — I82.413 ACUTE DEEP VEIN THROMBOSIS (DVT) OF FEMORAL VEIN OF BOTH LOWER EXTREMITIES (HCC): Primary | ICD-10-CM

## 2025-04-23 DIAGNOSIS — I11.0 HYPERTENSIVE HEART DISEASE WITH HEART FAILURE (HCC): ICD-10-CM

## 2025-04-23 NOTE — TELEPHONE ENCOUNTER
Spoke to wife and patient, they stated urine does seem darker again.  No burning or pain with urination.   Labs ordered.      Patient has all appointments

## 2025-04-23 NOTE — TELEPHONE ENCOUNTER
Erika called the office to relay that they were able to get an appt with UC and are scheduled 07/23. She stated she wanted to make Bhavya aware.

## 2025-04-24 DIAGNOSIS — I82.413 ACUTE DEEP VEIN THROMBOSIS (DVT) OF FEMORAL VEIN OF BOTH LOWER EXTREMITIES (HCC): ICD-10-CM

## 2025-04-24 DIAGNOSIS — I11.0 HYPERTENSIVE HEART DISEASE WITH HEART FAILURE (HCC): ICD-10-CM

## 2025-04-24 LAB
ALBUMIN SERPL-MCNC: 4.3 G/DL (ref 3.4–5)
ALBUMIN/GLOB SERPL: 1.3 {RATIO} (ref 1.1–2.2)
ALP SERPL-CCNC: 104 U/L (ref 40–129)
ALT SERPL-CCNC: 31 U/L (ref 10–40)
ANION GAP SERPL CALCULATED.3IONS-SCNC: 12 MMOL/L (ref 3–16)
AST SERPL-CCNC: 35 U/L (ref 15–37)
BILIRUB SERPL-MCNC: 0.4 MG/DL (ref 0–1)
BUN SERPL-MCNC: 23 MG/DL (ref 7–20)
CALCIUM SERPL-MCNC: 9.5 MG/DL (ref 8.3–10.6)
CHLORIDE SERPL-SCNC: 104 MMOL/L (ref 99–110)
CO2 SERPL-SCNC: 22 MMOL/L (ref 21–32)
CREAT SERPL-MCNC: 1.3 MG/DL (ref 0.8–1.3)
DEPRECATED RDW RBC AUTO: 16.9 % (ref 12.4–15.4)
GFR SERPLBLD CREATININE-BSD FMLA CKD-EPI: 54 ML/MIN/{1.73_M2}
GLUCOSE SERPL-MCNC: 136 MG/DL (ref 70–99)
HCT VFR BLD AUTO: 37.5 % (ref 40.5–52.5)
HGB BLD-MCNC: 12.9 G/DL (ref 13.5–17.5)
MCH RBC QN AUTO: 30.3 PG (ref 26–34)
MCHC RBC AUTO-ENTMCNC: 34.3 G/DL (ref 31–36)
MCV RBC AUTO: 88.3 FL (ref 80–100)
PLATELET # BLD AUTO: 257 K/UL (ref 135–450)
PMV BLD AUTO: 9.4 FL (ref 5–10.5)
POTASSIUM SERPL-SCNC: 4.1 MMOL/L (ref 3.5–5.1)
PROT SERPL-MCNC: 7.6 G/DL (ref 6.4–8.2)
RBC # BLD AUTO: 4.24 M/UL (ref 4.2–5.9)
SODIUM SERPL-SCNC: 138 MMOL/L (ref 136–145)
WBC # BLD AUTO: 9.3 K/UL (ref 4–11)

## 2025-04-24 NOTE — TELEPHONE ENCOUNTER
LM on VM  To bring in Proof of Income and   Out-of-pocket prescription from pharmacy  Before we can process the patient assistance

## 2025-04-24 NOTE — TELEPHONE ENCOUNTER
came into the office with patient about getting urine and stool sample and per Suzanne GREENE that we do not order these labs ant to go to PCP Dr. Gongora for orders

## 2025-04-24 NOTE — TELEPHONE ENCOUNTER
Bhavya,   Patient presented to office with  and is wanting to test urine and stool. Told them to contact PCP to ask to order Urine Culture and Stool sample and any other appropriate testing they deem necessary

## 2025-04-24 NOTE — TELEPHONE ENCOUNTER
Patients wife Erika dropped off Patient assistance forms.   Placed in Dr. Pal's MA folder.     Erika states Joseph only has enough medication for 19 days.    Please advise.     Callback: [phone]

## 2025-04-24 NOTE — TELEPHONE ENCOUNTER
Patients wife Erika came into the office.   She states the discoloration has gone away, however there is a streak of blood. Its not there all the time, but it is happening occasionally.     Getting labs done today 4/24.     Please advise.     Callback: [phone]

## 2025-04-25 ENCOUNTER — OFFICE VISIT (OUTPATIENT)
Dept: FAMILY MEDICINE CLINIC | Age: 85
End: 2025-04-25

## 2025-04-25 VITALS
OXYGEN SATURATION: 96 % | HEART RATE: 76 BPM | BODY MASS INDEX: 30.13 KG/M2 | RESPIRATION RATE: 16 BRPM | DIASTOLIC BLOOD PRESSURE: 82 MMHG | SYSTOLIC BLOOD PRESSURE: 128 MMHG | WEIGHT: 216 LBS

## 2025-04-25 DIAGNOSIS — K92.1 MELENA: ICD-10-CM

## 2025-04-25 DIAGNOSIS — R31.9 HEMATURIA, UNSPECIFIED TYPE: Primary | ICD-10-CM

## 2025-04-25 DIAGNOSIS — I82.403 LEG DVT (DEEP VENOUS THROMBOEMBOLISM), ACUTE, BILATERAL (HCC): ICD-10-CM

## 2025-04-25 LAB
BILIRUBIN, POC: ABNORMAL
BLOOD URINE, POC: ABNORMAL
CLARITY, POC: ABNORMAL
COLOR, POC: ABNORMAL
DEPRECATED RDW RBC AUTO: 17.1 % (ref 12.4–15.4)
GLUCOSE URINE, POC: ABNORMAL MG/DL
HCT VFR BLD AUTO: 37.8 % (ref 40.5–52.5)
HGB BLD-MCNC: 12.5 G/DL (ref 13.5–17.5)
KETONES, POC: ABNORMAL MG/DL
LEUKOCYTE EST, POC: ABNORMAL
MCH RBC QN AUTO: 29.6 PG (ref 26–34)
MCHC RBC AUTO-ENTMCNC: 33.2 G/DL (ref 31–36)
MCV RBC AUTO: 89.3 FL (ref 80–100)
NITRITE, POC: ABNORMAL
PH, POC: 6
PLATELET # BLD AUTO: 238 K/UL (ref 135–450)
PMV BLD AUTO: 9.1 FL (ref 5–10.5)
PROTEIN, POC: 300 MG/DL
RBC # BLD AUTO: 4.23 M/UL (ref 4.2–5.9)
SPECIFIC GRAVITY, POC: 1.01
UROBILINOGEN, POC: 0.2 MG/DL
WBC # BLD AUTO: 9.4 K/UL (ref 4–11)

## 2025-04-25 NOTE — PROGRESS NOTES
2025    Blood pressure 128/82, pulse 76, resp. rate 16, weight 98 kg (216 lb), SpO2 96%.    Joseph Graff (:  1940) is a 84 y.o. male, here for evaluation of the following medical concerns:    Chief Complaint   Patient presents with    Hematuria     Was seeing blood in urine and stool has been darker. Pt is on Eliquis (new med) and concerned about internal bleeding. Cardiology directed him to pcp for workup     Here for concern of hematuria - for past 7-10 days.  Intermittent.    UA done in office today shows large blood, moderate leukocytes.    Also having dark stools- intermittent- brown today.     He was started on eliquis for DVT about the same time.  He  was dx with the DVT  after prostate surgery (prostatectomy) earlier that month.  He could not start anticoagulation at that time due to continued postop hematuria.  So an IVC filter was placed.    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  No evidence of pulm art HTN.    Almost immediately after starting the eliquis 5 mg bid he started to note the hematuria.  'there is a chance I saw blood before taking eliquis'    No pain anywhere  No dysuria  No urine straining or hesitation    No loose BMs/diarrhea or tarry stools.    He notified Dr Pal yesterday about the bleeding- dark brown urine.   CBC di not show significant anemia.  Lab Results   Component Value Date    WBC 9.3 2025    HGB 12.9 (L) 2025    HCT 37.5 (L) 2025    MCV 88.3 2025     2025        He had a klebsiella UTI 3/13.    No chest pains, dizziness, heart palpitations, dyspnea, lightheadedness, worsening edema.     Last colonoscopy , DR See.  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

## 2025-04-25 NOTE — PATIENT INSTRUCTIONS
Remain on eliquis 5 mg BID for now    Since you are still having bloody urine 2 months after your prostatectomy.    Due to the upper GI bleeding, I think seeing GI is the right thing to do also.

## 2025-04-26 ENCOUNTER — RESULTS FOLLOW-UP (OUTPATIENT)
Dept: FAMILY MEDICINE CLINIC | Age: 85
End: 2025-04-26

## 2025-04-27 LAB
BACTERIA UR CULT: ABNORMAL
ORGANISM: ABNORMAL

## 2025-04-28 LAB
BACTERIA UR CULT: ABNORMAL
ORGANISM: ABNORMAL

## 2025-05-08 ENCOUNTER — TELEPHONE (OUTPATIENT)
Dept: INTERVENTIONAL RADIOLOGY/VASCULAR | Age: 85
End: 2025-05-08

## 2025-05-08 ENCOUNTER — TELEPHONE (OUTPATIENT)
Dept: CARDIOLOGY CLINIC | Age: 85
End: 2025-05-08

## 2025-05-08 NOTE — TELEPHONE ENCOUNTER
Nakia called to ask CBM wondering if Pt need CTA since the Pt was in the Cath Lab in April.  If cancelling the CTA please call the Pt to inform him and call Central Scheduling to cancel.  Please advise.

## 2025-05-08 NOTE — TELEPHONE ENCOUNTER
Per Dr. Pal note - Most recent ischemic evaluation was 2023 will perform a Coronary CTA as well     Call placed to Nakia from Radiology.  Verified patient will need Coronary CTA per Dr. Pal note.  She verbalized understanding.

## 2025-05-21 RX ORDER — ATORVASTATIN CALCIUM 40 MG/1
40 TABLET, FILM COATED ORAL DAILY
Qty: 90 TABLET | Refills: 1 | Status: SHIPPED | OUTPATIENT
Start: 2025-05-21

## 2025-05-22 ENCOUNTER — HOSPITAL ENCOUNTER (OUTPATIENT)
Dept: CT IMAGING | Age: 85
Discharge: HOME | End: 2025-05-22
Attending: STUDENT IN AN ORGANIZED HEALTH CARE EDUCATION/TRAINING PROGRAM
Payer: MEDICARE

## 2025-05-22 ENCOUNTER — HOSPITAL ENCOUNTER (OUTPATIENT)
Dept: PULMONOLOGY | Age: 85
Discharge: HOME | End: 2025-05-22
Attending: STUDENT IN AN ORGANIZED HEALTH CARE EDUCATION/TRAINING PROGRAM
Payer: MEDICARE

## 2025-05-22 VITALS
DIASTOLIC BLOOD PRESSURE: 94 MMHG | WEIGHT: 215 LBS | HEART RATE: 69 BPM | BODY MASS INDEX: 30.78 KG/M2 | HEIGHT: 70 IN | OXYGEN SATURATION: 98 % | SYSTOLIC BLOOD PRESSURE: 122 MMHG | TEMPERATURE: 97.9 F | RESPIRATION RATE: 16 BRPM

## 2025-05-22 VITALS — HEART RATE: 62 BPM | RESPIRATION RATE: 16 BRPM | OXYGEN SATURATION: 98 %

## 2025-05-22 DIAGNOSIS — R06.02 SHORTNESS OF BREATH: ICD-10-CM

## 2025-05-22 DIAGNOSIS — R07.9 CHEST PAIN, UNSPECIFIED TYPE: ICD-10-CM

## 2025-05-22 DIAGNOSIS — R93.1 ABNORMAL FINDINGS ON DIAGNOSTIC IMAGING OF HEART AND CORONARY CIRCULATION: ICD-10-CM

## 2025-05-22 LAB
DLCO %PRED: 87 %
DLCO PRED: NORMAL
DLCO/VA %PRED: NORMAL
DLCO/VA PRED: NORMAL
DLCO/VA: NORMAL
DLCO: NORMAL
EXPIRATORY TIME-POST: NORMAL
EXPIRATORY TIME: NORMAL
FEF 25-75 %CHNG: NORMAL
FEF 25-75 POST %PRED: NORMAL
FEF 25-75% %PRED-PRE: NORMAL
FEF 25-75% PRED: NORMAL
FEF 25-75-POST: NORMAL
FEF 25-75-PRE: NORMAL
FEV1 %PRED-POST: NORMAL
FEV1 %PRED-PRE: 85 %
FEV1 PRED: NORMAL
FEV1-POST: NORMAL
FEV1-PRE: NORMAL
FEV1/FVC %PRED-POST: NORMAL
FEV1/FVC %PRED-PRE: NORMAL
FEV1/FVC PRED: NORMAL
FEV1/FVC-POST: NORMAL
FEV1/FVC-PRE: 69 %
FVC %PRED-POST: NORMAL
FVC %PRED-PRE: NORMAL
FVC PRED: NORMAL
FVC-POST: NORMAL
FVC-PRE: NORMAL
GAW %PRED: NORMAL
GAW PRED: NORMAL
GAW: NORMAL
IC PRE %PRED: NORMAL
IC PRED: NORMAL
IC: NORMAL
MEP: NORMAL
MIP: NORMAL
MVV %PRED-PRE: NORMAL
MVV PRED: NORMAL
MVV-PRE: NORMAL
PEF %PRED-POST: NORMAL
PEF %PRED-PRE: NORMAL
PEF PRED: NORMAL
PEF%CHNG: NORMAL
PEF-POST: NORMAL
PEF-PRE: NORMAL
RAW %PRED: NORMAL
RAW PRED: NORMAL
RAW: NORMAL
RV PRE %PRED: NORMAL
RV PRED: NORMAL
RV: NORMAL
SVC %PRED: NORMAL
SVC PRED: NORMAL
SVC: NORMAL
TLC PRE %PRED: 111 %
TLC PRED: NORMAL
TLC: NORMAL
VA %PRED: NORMAL
VA PRED: NORMAL
VA: NORMAL
VTG %PRED: NORMAL
VTG PRED: NORMAL
VTG: NORMAL

## 2025-05-22 PROCEDURE — 6370000000 HC RX 637 (ALT 250 FOR IP): Performed by: STUDENT IN AN ORGANIZED HEALTH CARE EDUCATION/TRAINING PROGRAM

## 2025-05-22 PROCEDURE — 94760 N-INVAS EAR/PLS OXIMETRY 1: CPT

## 2025-05-22 PROCEDURE — 6360000004 HC RX CONTRAST MEDICATION: Performed by: STUDENT IN AN ORGANIZED HEALTH CARE EDUCATION/TRAINING PROGRAM

## 2025-05-22 PROCEDURE — 94726 PLETHYSMOGRAPHY LUNG VOLUMES: CPT

## 2025-05-22 PROCEDURE — 94010 BREATHING CAPACITY TEST: CPT

## 2025-05-22 PROCEDURE — 94729 DIFFUSING CAPACITY: CPT

## 2025-05-22 PROCEDURE — 75574 CT ANGIO HRT W/3D IMAGE: CPT

## 2025-05-22 RX ORDER — IOPAMIDOL 755 MG/ML
85 INJECTION, SOLUTION INTRAVASCULAR
Status: COMPLETED | OUTPATIENT
Start: 2025-05-22 | End: 2025-05-22

## 2025-05-22 RX ORDER — SODIUM CHLORIDE 0.9 % (FLUSH) 0.9 %
5-40 SYRINGE (ML) INJECTION PRN
Status: DISCONTINUED | OUTPATIENT
Start: 2025-05-22 | End: 2025-05-23 | Stop reason: HOSPADM

## 2025-05-22 RX ORDER — NITROGLYCERIN 0.4 MG/1
0.4 TABLET SUBLINGUAL
Status: COMPLETED | OUTPATIENT
Start: 2025-05-22 | End: 2025-05-22

## 2025-05-22 RX ORDER — ALBUTEROL SULFATE 90 UG/1
4 INHALANT RESPIRATORY (INHALATION) ONCE
Status: CANCELLED | OUTPATIENT
Start: 2025-05-22

## 2025-05-22 RX ORDER — NITROGLYCERIN 0.4 MG/1
0.8 TABLET SUBLINGUAL
Status: COMPLETED | OUTPATIENT
Start: 2025-05-22 | End: 2025-05-22

## 2025-05-22 RX ORDER — SODIUM CHLORIDE 0.9 % (FLUSH) 0.9 %
5-40 SYRINGE (ML) INJECTION EVERY 12 HOURS SCHEDULED
Status: DISCONTINUED | OUTPATIENT
Start: 2025-05-22 | End: 2025-05-23 | Stop reason: HOSPADM

## 2025-05-22 RX ORDER — METOPROLOL TARTRATE 1 MG/ML
5 INJECTION, SOLUTION INTRAVENOUS EVERY 5 MIN PRN
Status: DISCONTINUED | OUTPATIENT
Start: 2025-05-22 | End: 2025-05-23 | Stop reason: HOSPADM

## 2025-05-22 RX ORDER — SODIUM CHLORIDE 9 MG/ML
INJECTION, SOLUTION INTRAVENOUS PRN
Status: DISCONTINUED | OUTPATIENT
Start: 2025-05-22 | End: 2025-05-23 | Stop reason: HOSPADM

## 2025-05-22 RX ADMIN — NITROGLYCERIN 0.8 MG: 0.4 TABLET SUBLINGUAL at 12:08

## 2025-05-22 RX ADMIN — IOPAMIDOL 85 ML: 755 INJECTION, SOLUTION INTRAVENOUS at 12:05

## 2025-05-22 ASSESSMENT — PULMONARY FUNCTION TESTS
FEV1/FVC_PRE: 69
FEV1_PERCENT_PREDICTED_PRE: 85

## 2025-05-22 NOTE — FLOWSHEET NOTE
Prescan HR 63, afib. Pt tolerated procedure well. VSS. HR 62, Afib, /62 IV removed without difficulty. Pt given d/c instructions and stated understanding. Released in stable condition to home

## 2025-05-23 NOTE — PROCEDURES
Pulmonary Function Testing      Patient name:  Joseph Graff      Unit #:   [id number]   Date of test:  5/22/2025   Date of interpretation:   5/23/2025    Mr. Joseph Graff is a 84 y.o. year-old male. The spirometry data were acceptable and reproducible.     Spirometry:  Flow volume loops were normal. The FEV-1/FVC ratio was normal. The pre-bronchodilator FEV-1 was 2.33 liters (-0.81 Z score), which was normal. The FVC was 3.38 liters (-0.88 Z score), which was normal. Response to inhaled bronchodilators (albuterol) was not performed.    Lung volumes:  Lung volumes were tested by plethysmography. The total lung capacity was 7.91 liters (1.06 Z score), which was normal. The residual volume was 3.73 liters (2.68 Z score), which was increased. The ratio of residual volume to total lung capacity (RV/TLC) was 1.67 Z score, which was increased.     Diffusion capacity was found to be -0.47 Z score which is normal.      Interpretation:  Normal pulmonary function testing with exception of isolated increase in RV/TLC which could indicate mild air trapping    Comments:  Z score  Mild -1.645 to -2.5  Moderate -2.5 to -4.0  Severe: < -4.0     Using Z-scores can reduce age and height bias, and the recommended thresholds correlate with mortality risk.    Hilton Hermosillo MD

## 2025-05-25 ENCOUNTER — RESULTS FOLLOW-UP (OUTPATIENT)
Dept: CARDIOLOGY | Age: 85
End: 2025-05-25

## 2025-05-27 ENCOUNTER — TELEPHONE (OUTPATIENT)
Age: 85
End: 2025-05-27

## 2025-05-27 DIAGNOSIS — K86.89 PANCREATIC MASS: Primary | ICD-10-CM

## 2025-05-27 NOTE — TELEPHONE ENCOUNTER
Can you please schedule for CT of abdomen and Pelvis at  or [facility].    Order is placed.      Thank you

## 2025-06-03 ENCOUNTER — HOSPITAL ENCOUNTER (OUTPATIENT)
Dept: CT IMAGING | Age: 85
Discharge: HOME | End: 2025-06-03
Attending: STUDENT IN AN ORGANIZED HEALTH CARE EDUCATION/TRAINING PROGRAM
Payer: MEDICARE

## 2025-06-03 ENCOUNTER — TELEPHONE (OUTPATIENT)
Dept: CARDIOLOGY CLINIC | Age: 85
End: 2025-06-03

## 2025-06-03 DIAGNOSIS — K86.89 PANCREATIC MASS: ICD-10-CM

## 2025-06-03 PROCEDURE — 6360000004 HC RX CONTRAST MEDICATION: Performed by: STUDENT IN AN ORGANIZED HEALTH CARE EDUCATION/TRAINING PROGRAM

## 2025-06-03 PROCEDURE — 74177 CT ABD & PELVIS W/CONTRAST: CPT

## 2025-06-03 RX ORDER — HYDROCHLOROTHIAZIDE 12.5 MG/1
12.5 CAPSULE ORAL DAILY
Qty: 90 CAPSULE | Refills: 1 | Status: SHIPPED | OUTPATIENT
Start: 2025-06-03

## 2025-06-03 RX ADMIN — IOHEXOL 25 ML: 350 INJECTION, SOLUTION INTRAVENOUS at 11:33

## 2025-06-03 RX ADMIN — IOHEXOL 75 ML: 350 INJECTION, SOLUTION INTRAVENOUS at 11:32

## 2025-06-05 NOTE — TELEPHONE ENCOUNTER
Erika(wife) called to relay that they do not qualify for patient assistance and has to pay $800 out of pocket for this prescription.    Are there other alternative medications he can take?    Please advise.

## 2025-06-06 ENCOUNTER — TELEPHONE (OUTPATIENT)
Dept: CARDIOLOGY CLINIC | Age: 85
End: 2025-06-06

## 2025-06-06 NOTE — TELEPHONE ENCOUNTER
Xarelto starter   $631 30 day    coumadin 10mg  $1- 30    Spoke to pt they want to do coumadin and are okay with the weekly checks.     Erika is asking if pt should finish Eliquis or go straight to coumadin

## 2025-06-06 NOTE — TELEPHONE ENCOUNTER
Per Dr. Pal     We can see what the cost of Xarelto would be if that is to expensive then the opiton is Coumadin.  Explained you do have to have weekly checks for the coumadin.      Left voicemail message will await call.

## 2025-06-06 NOTE — TELEPHONE ENCOUNTER
Per patient and wife     Will attempt to see if Xarelto is covered and if not is agreeable to start coumadin and go to coumadin clinic.

## 2025-06-06 NOTE — TELEPHONE ENCOUNTER
Erika came into office to pickup samples. Spoke with Bhavya to relay and Bhavya spoke with Erika. Bhavya relayed that she will place order for Xarelto and Erika can check pricing at pharmacy. Also gave additional information on coumadin. Erika v/u.     Scheduled Joseph for 7/28 to discuss testing results.

## 2025-06-06 NOTE — TELEPHONE ENCOUNTER
Patient does not qualify for patient assistance  His pharmacy isn't excepting the coupon anymore due to to many people using it.    He would like to get it changed to something else or \"he stated he just won't take it\"    He would appreciate Samples to get him through   They need other options where to get it cheap please but with samples  Wife says PLEASE change it!

## 2025-06-09 DIAGNOSIS — Z79.01 ANTICOAGULATED ON COUMADIN: Primary | ICD-10-CM

## 2025-06-09 RX ORDER — WARFARIN SODIUM 2.5 MG/1
2.5 TABLET ORAL DAILY
Qty: 30 TABLET | Refills: 3 | Status: SHIPPED | OUTPATIENT
Start: 2025-06-09

## 2025-06-10 ENCOUNTER — TELEPHONE (OUTPATIENT)
Dept: PHARMACY | Age: 85
End: 2025-06-10

## 2025-06-10 NOTE — TELEPHONE ENCOUNTER
Received electronic referral from Dr. Pal for pt to begin INR management in the anticoagulation clinic. Pt was prescribed warfarin to [facility] pharmacy yesterday (6/9).    Called and spoke to pt and wife. Pt states that he is a hx pt of the clinic (2015) and is familiar with warfarin. He has not picked up warfarin from pharmacy yet but plans to when get receives notification that it is ready. Asked for pt/wife to contact clinic when picks up and starts to take warfarin. Will schedule initial appt in clinic ~5 days after starting warfarin. Reviewed location of clinic.     Pt is hard of hearing.

## 2025-06-12 NOTE — TELEPHONE ENCOUNTER
Pt wife returned call to clinic. States that they picked up warfarin from pharmacy. He is still taking Eliquis, has 3 free packages of Eliquis (7 d in each packages). Plans to use the rest of Eliquis has at home prior to starting warfarin.     Explained clinic will contact pt ~7/1 when finishing Eliquis in order to provide start instructions for warfarin. Will schedule initial appt in clinic then. If pt begins to run out of Eliquis sooner, wife will contact clinic

## 2025-07-02 DIAGNOSIS — I10 ESSENTIAL HYPERTENSION: ICD-10-CM

## 2025-07-02 RX ORDER — AMLODIPINE BESYLATE 2.5 MG/1
2.5 TABLET ORAL DAILY
Qty: 90 TABLET | Refills: 1 | Status: SHIPPED | OUTPATIENT
Start: 2025-07-02

## 2025-07-14 ENCOUNTER — ANTI-COAG VISIT (OUTPATIENT)
Dept: PHARMACY | Age: 85
End: 2025-07-14
Payer: MEDICARE

## 2025-07-14 DIAGNOSIS — Z79.01 LONG TERM (CURRENT) USE OF ANTICOAGULANTS: Primary | ICD-10-CM

## 2025-07-14 LAB
INTERNATIONAL NORMALIZATION RATIO, POC: 1
PROTHROMBIN TIME, POC: 0

## 2025-07-14 PROCEDURE — 99213 OFFICE O/P EST LOW 20 MIN: CPT | Performed by: PHYSICIAN ASSISTANT

## 2025-07-14 PROCEDURE — 85610 PROTHROMBIN TIME: CPT | Performed by: PHYSICIAN ASSISTANT

## 2025-07-14 NOTE — PROGRESS NOTES
Mr. Joseph Graff is a 84 y.o. y/o male with history of long term use of anticoagulants who presents today for anticoagulation monitoring and adjustment.    Pertinent PMH:  he is a hx pt of the clinic (2015). Was on Eliquis but too costly   Patient Reported Findings:  Yes     No  [x]   []       Patient verifies current dosing regimen as listed transitioned from Eliquis to warfarin 2.5 mg qd on 7/9  []   [x]       S/S bleeding/bruising/swelling/SOB denies   []   [x]       Blood in urine or stool denies  []   [x]       Procedures scheduled in the future at this time states that he wants to have dental cleaning, reviewed it is safe to have cleanings while on warfarin.   []   [x]       Missed Dose denies   []   [x]       Extra Dose denies   [x]   []       Change in medications reviewed med list with patient. Reviewed not to take NSAIDs, take tylenol instead. Does not take MVI  [x]   []       Change in health/diet/appetite - had argent[place] greens twice in the past week but usually only has once a month. Eats daniela salads, broccoli, brussel sprouts every day. Reviewed importance of consistency    []   [x]       Change in alcohol use does not drink  []   [x]       Change in activity  []   [x]       Hospital admission  []   [x]       Emergency department visit  []   [x]       Other complaints pt was hx therapeutic on 7.5 mg daily of warfarin     Clinical Outcomes:  Yes     No  []   [x]       Major bleeding event  []   [x]       Thromboembolic event  Has warfarin 2.5 mg tablets at home   Takes warfarin in am  Duration of warfarin Therapy: indefinite   INR Range:  2.0-3.0    Educated patient of signs and symptoms of bleeding and clotting, when to seek emergent care, the need to maintain a consistent diet and notification of clinic for any new medications including over the counter medications or abnormal bleeding. Patient acknowledges working in consult agreement with pharmacist as referred by his/her physician.    Presents

## 2025-07-18 ENCOUNTER — ANTI-COAG VISIT (OUTPATIENT)
Dept: PHARMACY | Age: 85
End: 2025-07-18
Payer: MEDICARE

## 2025-07-18 DIAGNOSIS — Z79.01 LONG TERM (CURRENT) USE OF ANTICOAGULANTS: Primary | ICD-10-CM

## 2025-07-18 LAB
INTERNATIONAL NORMALIZATION RATIO, POC: 1.2
PROTHROMBIN TIME, POC: 0

## 2025-07-18 PROCEDURE — 99212 OFFICE O/P EST SF 10 MIN: CPT

## 2025-07-18 PROCEDURE — 85610 PROTHROMBIN TIME: CPT

## 2025-07-18 NOTE — PROGRESS NOTES
Mr. Joseph Graff is a 84 y.o. y/o male with history of long term use of anticoagulants who presents today for anticoagulation monitoring and adjustment.    Pertinent PMH:  he is a hx pt of the clinic (2015). Was on Eliquis but too costly   Patient Reported Findings:  Yes     No  [x]   []       Patient verifies current dosing regimen as listed transitioned from Eliquis to warfarin 2.5 mg qd on 7/9---> 5mg daily since Monday   []   [x]       S/S bleeding/bruising/swelling/SOB denies   []   [x]       Blood in urine or stool denies  []   [x]       Procedures scheduled in the future at this time states that he wants to have dental cleaning, reviewed it is safe to have cleanings while on warfarin.   []   [x]       Missed Dose denies   []   [x]       Extra Dose denies   [x]   []       Change in medications reviewed med list with patient. Reviewed not to take NSAIDs, take tylenol instead. Does not take MVI---> started bioflex OTC several weeks ago, no other changes   [x]   []       Change in health/diet/appetite - had argentina greens twice in the past week but usually only has once a month. Eats daniela salads, broccoli, brussel sprouts every day. Reviewed importance of consistency---> working on consistency, stopped cranberries     []   [x]       Change in alcohol use does not drink  []   [x]       Change in activity  []   [x]       Hospital admission  []   [x]       Emergency department visit  []   [x]       Other complaints pt was hx therapeutic on 7.5 mg daily of warfarin     Clinical Outcomes:  Yes     No  []   [x]       Major bleeding event  []   [x]       Thromboembolic event  Has warfarin 2.5 mg tablets at home   Takes warfarin in am  Duration of warfarin Therapy: indefinite   INR Range:  2.0-3.0    Presents with wife   Has 30 tablets of warfarin 2.5 mg with no refills     INR is 1.2 today after dose increase, patient restarted warfarin 10 days ago (was previously a patient of ours, left in 2015, hx dose of 7.5mg

## 2025-07-21 ENCOUNTER — ANTI-COAG VISIT (OUTPATIENT)
Dept: PHARMACY | Age: 85
End: 2025-07-21
Payer: MEDICARE

## 2025-07-21 DIAGNOSIS — Z79.01 LONG TERM (CURRENT) USE OF ANTICOAGULANTS: Primary | ICD-10-CM

## 2025-07-21 LAB
INTERNATIONAL NORMALIZATION RATIO, POC: 1.4
PROTHROMBIN TIME, POC: 0

## 2025-07-21 PROCEDURE — 85610 PROTHROMBIN TIME: CPT

## 2025-07-21 PROCEDURE — 99212 OFFICE O/P EST SF 10 MIN: CPT

## 2025-07-21 NOTE — PROGRESS NOTES
Mr. Joseph Graff is a 84 y.o. y/o male with history of long term use of anticoagulants who presents today for anticoagulation monitoring and adjustment.    Pertinent PMH:  he is a hx pt of the clinic (2015). Was on Eliquis but too costly   Patient Reported Findings:  Yes     No  [x]   []       Patient verifies current dosing regimen as listed transitioned from Eliquis to warfarin 2.5 mg qd on 7/9---> 5mg daily since Monday ---> confirmed 7.5mg daily since last visit   []   [x]       S/S bleeding/bruising/swelling/SOB denies   []   [x]       Blood in urine or stool denies  []   [x]       Procedures scheduled in the future at this time states that he wants to have dental cleaning, reviewed it is safe to have cleanings while on warfarin. ---> denies   []   [x]       Missed Dose denies   []   [x]       Extra Dose denies   [x]   []       Change in medications reviewed med list with patient. Reviewed not to take NSAIDs, take tylenol instead. Does not take MVI---> started bioflex OTC several weeks ago, no other changes ---> denies   [x]   []       Change in health/diet/appetite - had argentina greens twice in the past week but usually only has once a month. Eats daniela salads, broccoli, brussel sprouts every day. Reviewed importance of consistency---> working on consistency, stopped cranberries---> mixed green salads every other day     []   [x]       Change in alcohol use does not drink  []   [x]       Change in activity  []   [x]       Hospital admission  []   [x]       Emergency department visit  []   [x]       Other complaints pt was hx therapeutic on 7.5 mg daily of warfarin     Clinical Outcomes:  Yes     No  []   [x]       Major bleeding event  []   [x]       Thromboembolic event  Has warfarin 2.5 mg tablets at home   Takes warfarin in am  Duration of warfarin Therapy: indefinite   INR Range:  2.0-3.0    Presents with wife   Has 30 tablets of warfarin 2.5 mg with no refills ---> did not want to switch tablet

## 2025-07-21 NOTE — TELEPHONE ENCOUNTER
Warfarin prescription phoned into [facility] [id number] - [place] [id number] - P [phone] - F [phone] under [address]ad[address] Foster   Warfarin 2.5 mg tabs  Take 7.5 mg (2.5 mg x 3) every day   30 days   0 refills    Elenita Beth, PharmD, [facility]

## 2025-07-24 ENCOUNTER — TRANSCRIBE ORDERS (OUTPATIENT)
Dept: ADMINISTRATIVE | Age: 85
End: 2025-07-24

## 2025-07-24 DIAGNOSIS — D86.9 SARCOIDOSIS: Primary | ICD-10-CM

## 2025-07-25 ENCOUNTER — ANTI-COAG VISIT (OUTPATIENT)
Dept: PHARMACY | Age: 85
End: 2025-07-25
Payer: MEDICARE

## 2025-07-25 DIAGNOSIS — Z79.01 LONG TERM (CURRENT) USE OF ANTICOAGULANTS: Primary | ICD-10-CM

## 2025-07-25 LAB
INTERNATIONAL NORMALIZATION RATIO, POC: 2.1
PROTHROMBIN TIME, POC: 0

## 2025-07-25 PROCEDURE — 85610 PROTHROMBIN TIME: CPT

## 2025-07-25 PROCEDURE — 99212 OFFICE O/P EST SF 10 MIN: CPT

## 2025-07-25 NOTE — PROGRESS NOTES
Mr. Joseph Graff is a 84 y.o. y/o male with history of long term use of anticoagulants who presents today for anticoagulation monitoring and adjustment.    Pertinent PMH:  he is a hx pt of the clinic (2015). Was on Eliquis but too costly   Patient Reported Findings:  Yes     No  [x]   []       Patient verifies current dosing regimen as listed transitioned from Eliquis to warfarin 2.5 mg qd on 7/9---> 5mg daily since Monday ---> confirmed 7.5mg daily since last visit ---> confirmed   []   [x]       S/S bleeding/bruising/swelling/SOB denies   []   [x]       Blood in urine or stool denies  []   [x]       Procedures scheduled in the future at this time states that he wants to have dental cleaning, reviewed it is safe to have cleanings while on warfarin. ---> denies   []   [x]       Missed Dose denies   []   [x]       Extra Dose denies   [x]   []       Change in medications reviewed med list with patient. Reviewed not to take NSAIDs, take tylenol instead. Does not take MVI---> started bioflex OTC several weeks ago, no other changes ---> denies   [x]   []       Change in health/diet/appetite - had argentina greens twice in the past week but usually only has once a month. Eats daniela salads, broccoli, brussel sprouts every day. Reviewed importance of consistency---> working on consistency, stopped cranberries---> mixed green salads every other day ---> states hard to remain consistent with hot weather     []   [x]       Change in alcohol use does not drink  []   [x]       Change in activity  []   [x]       Hospital admission  []   [x]       Emergency department visit  []   [x]       Other complaints pt was hx therapeutic on 7.5 mg daily of warfarin     Clinical Outcomes:  Yes     No  []   [x]       Major bleeding event  []   [x]       Thromboembolic event  Has warfarin 2.5 mg tablets at home   Takes warfarin in am  Duration of warfarin Therapy: indefinite   INR Range:  2.0-3.0    Presents with wife   Has 30 tablets of

## 2025-07-28 ENCOUNTER — TELEPHONE (OUTPATIENT)
Dept: CARDIOLOGY CLINIC | Age: 85
End: 2025-07-28

## 2025-07-28 NOTE — TELEPHONE ENCOUNTER
Patient missed appointment today because they went to [facility].  Called patient and spoke with Erika his wife and they need an appointment sooner than October.  Patient's wife said patient can go to [facility] or [place], did not want to go to .  Please advise if appointment sooner or can patient see NP?  Patient's wife said patient cannot do anything that he is out of breath.  Patient had sarcoid previously.  Pt would like pulmonary rehab to be in [place].  Dr. José increased his Warfarin.  He is now taking 3 pills per day.  This was started Friday.  Thanks.

## 2025-07-30 NOTE — TELEPHONE ENCOUNTER
Patients wife called in.     She is worried that they haven't received a callback yet.     Please advise.   Callback: [phone]

## 2025-07-31 ENCOUNTER — ANTI-COAG VISIT (OUTPATIENT)
Dept: PHARMACY | Age: 85
End: 2025-07-31
Payer: MEDICARE

## 2025-07-31 DIAGNOSIS — Z79.01 LONG TERM (CURRENT) USE OF ANTICOAGULANTS: Primary | ICD-10-CM

## 2025-07-31 LAB
INTERNATIONAL NORMALIZATION RATIO, POC: 2.2
PROTHROMBIN TIME, POC: 0

## 2025-07-31 PROCEDURE — 99211 OFF/OP EST MAY X REQ PHY/QHP: CPT

## 2025-07-31 PROCEDURE — 85610 PROTHROMBIN TIME: CPT

## 2025-07-31 NOTE — PROGRESS NOTES
Mr. Joseph Graff is a 84 y.o. y/o male with history of long term use of anticoagulants who presents today for anticoagulation monitoring and adjustment.    Pertinent PMH:  he is a hx pt of the clinic (2015). Was on Eliquis but too costly   Patient Reported Findings:  Yes     No  [x]   []       Patient verifies current dosing regimen as listed transitioned from Eliquis to warfarin 2.5 mg qd on 7/9---> 5mg daily since Monday ---> confirmed 7.5mg daily since last visit ---> confirmed   [x]   []       S/S bleeding/bruising/swelling/SOB - States swelling by the night time but that it goes away by the morning.   []   [x]       Blood in urine or stool denies  [x]   []       Procedures scheduled in the future at this time states that he wants to have dental cleaning, reviewed it is safe to have cleanings while on warfarin. ---> Will be calling Dr. Pal to get a CT; timeframe unknown.   []   [x]       Missed Dose denies   []   [x]       Extra Dose denies   []   [x]       Change in medications reviewed med list with patient. Reviewed not to take NSAIDs, take tylenol instead. Does not take MVI---> started bioflex OTC several weeks ago, no other changes ---> denies   [x]   []       Change in health/diet/appetite - had argentina greens twice in the past week but usually only has once a month. Eats daniela salads, broccoli, brussel sprouts every day. Reviewed importance of consistency---> working on consistency, stopped cranberries---> mixed green salads every other day ---> states hard to remain consistent with hot weather--> consistent with a salad daily  []   [x]       Change in alcohol use does not drink  []   [x]       Change in activity  []   [x]       Hospital admission  []   [x]       Emergency department visit  []   [x]       Other complaints pt was hx therapeutic on 7.5 mg daily of warfarin     Clinical Outcomes:  Yes     No  []   [x]       Major bleeding event  []   [x]       Thromboembolic event  Has warfarin 2.5 mg

## 2025-08-04 ENCOUNTER — OFFICE VISIT (OUTPATIENT)
Dept: CARDIOLOGY CLINIC | Age: 85
End: 2025-08-04
Payer: MEDICARE

## 2025-08-04 VITALS
SYSTOLIC BLOOD PRESSURE: 126 MMHG | WEIGHT: 232.8 LBS | DIASTOLIC BLOOD PRESSURE: 78 MMHG | HEART RATE: 78 BPM | BODY MASS INDEX: 33.33 KG/M2 | OXYGEN SATURATION: 94 % | HEIGHT: 70 IN

## 2025-08-04 DIAGNOSIS — I82.413 ACUTE DEEP VEIN THROMBOSIS (DVT) OF FEMORAL VEIN OF BOTH LOWER EXTREMITIES (HCC): Primary | ICD-10-CM

## 2025-08-04 DIAGNOSIS — I27.20 PULMONARY HYPERTENSION (HCC): ICD-10-CM

## 2025-08-04 DIAGNOSIS — R06.02 SOB (SHORTNESS OF BREATH): ICD-10-CM

## 2025-08-04 DIAGNOSIS — I10 ESSENTIAL HYPERTENSION: ICD-10-CM

## 2025-08-04 PROCEDURE — G8417 CALC BMI ABV UP PARAM F/U: HCPCS | Performed by: STUDENT IN AN ORGANIZED HEALTH CARE EDUCATION/TRAINING PROGRAM

## 2025-08-04 PROCEDURE — G2211 COMPLEX E/M VISIT ADD ON: HCPCS | Performed by: STUDENT IN AN ORGANIZED HEALTH CARE EDUCATION/TRAINING PROGRAM

## 2025-08-04 PROCEDURE — 3078F DIAST BP <80 MM HG: CPT | Performed by: STUDENT IN AN ORGANIZED HEALTH CARE EDUCATION/TRAINING PROGRAM

## 2025-08-04 PROCEDURE — 99215 OFFICE O/P EST HI 40 MIN: CPT | Performed by: STUDENT IN AN ORGANIZED HEALTH CARE EDUCATION/TRAINING PROGRAM

## 2025-08-04 PROCEDURE — 1159F MED LIST DOCD IN RCRD: CPT | Performed by: STUDENT IN AN ORGANIZED HEALTH CARE EDUCATION/TRAINING PROGRAM

## 2025-08-04 PROCEDURE — 3074F SYST BP LT 130 MM HG: CPT | Performed by: STUDENT IN AN ORGANIZED HEALTH CARE EDUCATION/TRAINING PROGRAM

## 2025-08-04 PROCEDURE — 1036F TOBACCO NON-USER: CPT | Performed by: STUDENT IN AN ORGANIZED HEALTH CARE EDUCATION/TRAINING PROGRAM

## 2025-08-04 PROCEDURE — 1123F ACP DISCUSS/DSCN MKR DOCD: CPT | Performed by: STUDENT IN AN ORGANIZED HEALTH CARE EDUCATION/TRAINING PROGRAM

## 2025-08-04 PROCEDURE — G8427 DOCREV CUR MEDS BY ELIG CLIN: HCPCS | Performed by: STUDENT IN AN ORGANIZED HEALTH CARE EDUCATION/TRAINING PROGRAM

## 2025-08-04 PROCEDURE — 1126F AMNT PAIN NOTED NONE PRSNT: CPT | Performed by: STUDENT IN AN ORGANIZED HEALTH CARE EDUCATION/TRAINING PROGRAM

## 2025-08-11 RX ORDER — WARFARIN SODIUM 2.5 MG/1
7.5 TABLET ORAL DAILY
Qty: 90 TABLET | Refills: 3 | Status: SHIPPED | OUTPATIENT
Start: 2025-08-11

## 2025-08-14 ENCOUNTER — ANTI-COAG VISIT (OUTPATIENT)
Dept: PHARMACY | Age: 85
End: 2025-08-14
Payer: MEDICARE

## 2025-08-14 DIAGNOSIS — Z79.01 LONG TERM (CURRENT) USE OF ANTICOAGULANTS: Primary | ICD-10-CM

## 2025-08-14 LAB
INTERNATIONAL NORMALIZATION RATIO, POC: 2.1
PROTHROMBIN TIME, POC: NORMAL

## 2025-08-14 PROCEDURE — 99211 OFF/OP EST MAY X REQ PHY/QHP: CPT

## 2025-08-14 PROCEDURE — 85610 PROTHROMBIN TIME: CPT

## 2025-08-18 ENCOUNTER — HOSPITAL ENCOUNTER (OUTPATIENT)
Dept: CT IMAGING | Age: 85
Discharge: HOME | End: 2025-08-18
Payer: MEDICARE

## 2025-08-18 DIAGNOSIS — D86.9 SARCOIDOSIS: ICD-10-CM

## 2025-08-18 PROCEDURE — 71250 CT THORAX DX C-: CPT

## 2025-09-04 ENCOUNTER — ANTI-COAG VISIT (OUTPATIENT)
Dept: PHARMACY | Age: 85
End: 2025-09-04
Payer: MEDICARE

## 2025-09-04 DIAGNOSIS — Z79.01 LONG TERM (CURRENT) USE OF ANTICOAGULANTS: Primary | ICD-10-CM

## 2025-09-04 LAB
INTERNATIONAL NORMALIZATION RATIO, POC: 1.9
PROTHROMBIN TIME, POC: NORMAL

## 2025-09-04 PROCEDURE — 85610 PROTHROMBIN TIME: CPT

## 2025-09-04 PROCEDURE — 99211 OFF/OP EST MAY X REQ PHY/QHP: CPT

## (undated) DEVICE — CATHETER DIAG AD 5FR L110CM 145DEG COR GRY HYDRPHLC NYL

## (undated) DEVICE — INTRODUCER SHTH 0.018 IN 4 FRX40 CM KT SFT TIP NIT SS VSI

## (undated) DEVICE — PINNACLE INTRODUCER SHEATH: Brand: PINNACLE

## (undated) DEVICE — CATHETER THRMDIL 7FR L110CM STD PULM ART 4 INFUS LUMN SWN

## (undated) DEVICE — GUIDEWIRE VASC L150CM DIA0.035IN FLX END L7CM J 3MM PTFE